# Patient Record
Sex: MALE | Race: WHITE | NOT HISPANIC OR LATINO | Employment: OTHER | URBAN - METROPOLITAN AREA
[De-identification: names, ages, dates, MRNs, and addresses within clinical notes are randomized per-mention and may not be internally consistent; named-entity substitution may affect disease eponyms.]

---

## 2018-08-15 ENCOUNTER — HOSPITAL ENCOUNTER (OUTPATIENT)
Dept: FAMILY MEDICINE CLINIC | Facility: CLINIC | Age: 48
Setting detail: SPECIMEN
Discharge: HOME | End: 2018-08-15
Attending: FAMILY MEDICINE | Admitting: FAMILY MEDICINE

## 2018-08-15 LAB
ALBUMIN SERPL-MCNC: 3.8 G/DL (ref 3.5–4.8)
ALBUMIN/GLOB SERPL: 1.1 {RATIO} (ref 1–1.7)
ALP SERPL-CCNC: 117 IU/L (ref 32–91)
ALT SERPL-CCNC: 17 IU/L (ref 17–63)
ANION GAP SERPL CALC-SCNC: 10.9 MMOL/L (ref 10–20)
AST SERPL-CCNC: 24 IU/L (ref 15–41)
BASOPHILS # BLD AUTO: 0 10*3/UL (ref 0–0.2)
BASOPHILS NFR BLD AUTO: 0 % (ref 0–2)
BILIRUB SERPL-MCNC: 0.7 MG/DL (ref 0.3–1.2)
BUN SERPL-MCNC: 10 MG/DL (ref 8–20)
BUN/CREAT SERPL: 9.1 (ref 6.2–20.3)
CALCIUM SERPL-MCNC: 9.4 MG/DL (ref 8.9–10.3)
CHLORIDE SERPL-SCNC: 102 MMOL/L (ref 101–111)
CHOLEST SERPL-MCNC: 102 MG/DL
CHOLEST/HDLC SERPL: 2.8 {RATIO}
CONV CO2: 31 MMOL/L (ref 22–32)
CONV LDL CHOLESTEROL DIRECT: 53 MG/DL (ref 0–100)
CONV TOTAL PROTEIN: 7.4 G/DL (ref 6.1–7.9)
CREAT UR-MCNC: 1.1 MG/DL (ref 0.7–1.2)
DIFFERENTIAL METHOD BLD: (no result)
EOSINOPHIL # BLD AUTO: 0 % (ref 0–3)
EOSINOPHIL # BLD AUTO: 0 10*3/UL (ref 0–0.3)
ERYTHROCYTE [DISTWIDTH] IN BLOOD BY AUTOMATED COUNT: 14.6 % (ref 11.5–14.5)
GLOBULIN UR ELPH-MCNC: 3.6 G/DL (ref 2.5–3.8)
GLUCOSE SERPL-MCNC: 94 MG/DL (ref 65–99)
HCT VFR BLD AUTO: 48.7 % (ref 40–54)
HDLC SERPL-MCNC: 36 MG/DL
HGB BLD-MCNC: 15.7 G/DL (ref 14–18)
LDLC/HDLC SERPL: 1.5 {RATIO}
LIPID INTERPRETATION: ABNORMAL
LYMPHOCYTES # BLD AUTO: 2.3 10*3/UL (ref 0.8–4.8)
LYMPHOCYTES NFR BLD AUTO: 32 % (ref 18–42)
MCH RBC QN AUTO: 28.4 PG (ref 26–32)
MCHC RBC AUTO-ENTMCNC: 32.3 G/DL (ref 32–36)
MCV RBC AUTO: 87.8 FL (ref 80–94)
MONOCYTES # BLD AUTO: 0.6 10*3/UL (ref 0.1–1.3)
MONOCYTES NFR BLD AUTO: 9 % (ref 2–11)
NEUTROPHILS # BLD AUTO: 4.2 10*3/UL (ref 2.3–8.6)
NEUTROPHILS NFR BLD AUTO: 59 % (ref 50–75)
NRBC BLD AUTO-RTO: 0 /100{WBCS}
NRBC/RBC NFR BLD MANUAL: 0 10*3/UL
PLATELET # BLD AUTO: 292 10*3/UL (ref 150–450)
PMV BLD AUTO: 8.6 FL (ref 7.4–10.4)
POTASSIUM SERPL-SCNC: 4.9 MMOL/L (ref 3.6–5.1)
RBC # BLD AUTO: 5.55 10*6/UL (ref 4.6–6)
SODIUM SERPL-SCNC: 139 MMOL/L (ref 136–144)
TRIGL SERPL-MCNC: 43 MG/DL
VLDLC SERPL CALC-MCNC: 12.9 MG/DL
WBC # BLD AUTO: 7.2 10*3/UL (ref 4.5–11.5)

## 2018-08-23 ENCOUNTER — HOSPITAL ENCOUNTER (OUTPATIENT)
Dept: GENERAL RADIOLOGY | Facility: HOSPITAL | Age: 48
Discharge: HOME | End: 2018-08-23
Attending: FAMILY MEDICINE | Admitting: FAMILY MEDICINE

## 2019-02-05 ENCOUNTER — HOSPITAL ENCOUNTER (OUTPATIENT)
Dept: ORTHOPEDIC SURGERY | Facility: CLINIC | Age: 49
Setting detail: SPECIMEN
Discharge: HOME | End: 2019-02-05
Attending: PHYSICIAN ASSISTANT | Admitting: PHYSICIAN ASSISTANT

## 2019-02-05 LAB
ALBUMIN SERPL-MCNC: 3.8 G/DL (ref 3.5–4.8)
ALBUMIN/GLOB SERPL: 1 {RATIO} (ref 1–1.7)
ALP SERPL-CCNC: 105 IU/L (ref 32–91)
ALT SERPL-CCNC: 21 IU/L (ref 17–63)
ANION GAP SERPL CALC-SCNC: 14.7 MMOL/L (ref 10–20)
AST SERPL-CCNC: 28 IU/L (ref 15–41)
BILIRUB SERPL-MCNC: 0.9 MG/DL (ref 0.3–1.2)
BUN SERPL-MCNC: 13 MG/DL (ref 8–20)
BUN/CREAT SERPL: 13 (ref 6.2–20.3)
CALCIUM SERPL-MCNC: 9.4 MG/DL (ref 8.9–10.3)
CHLORIDE SERPL-SCNC: 104 MMOL/L (ref 101–111)
CONV CO2: 24 MMOL/L (ref 22–32)
CONV TOTAL PROTEIN: 7.7 G/DL (ref 6.1–7.9)
CREAT UR-MCNC: 1 MG/DL (ref 0.7–1.2)
GLOBULIN UR ELPH-MCNC: 3.9 G/DL (ref 2.5–3.8)
GLUCOSE SERPL-MCNC: 89 MG/DL (ref 65–99)
MAGNESIUM SERPL-MCNC: 2 MG/DL (ref 1.8–2.5)
POTASSIUM SERPL-SCNC: 4.7 MMOL/L (ref 3.6–5.1)
SODIUM SERPL-SCNC: 138 MMOL/L (ref 136–144)

## 2019-02-08 ENCOUNTER — HOSPITAL ENCOUNTER (OUTPATIENT)
Dept: OTHER | Facility: HOSPITAL | Age: 49
Discharge: HOME | End: 2019-02-08
Attending: PHYSICIAN ASSISTANT | Admitting: PHYSICIAN ASSISTANT

## 2019-06-25 ENCOUNTER — TELEPHONE (OUTPATIENT)
Dept: CARDIOLOGY | Facility: CLINIC | Age: 49
End: 2019-06-25

## 2019-07-02 NOTE — TELEPHONE ENCOUNTER
LM ON NUMBER LISTED FOR PATIENT. NUMBER FOR WIFE IS WORK NUMBER. PATIENT DOES HAVE UPCOMING APT 7/17/19.

## 2019-07-15 RX ORDER — GABAPENTIN 300 MG/1
CAPSULE ORAL
Qty: 60 CAPSULE | Refills: 1 | Status: SHIPPED | OUTPATIENT
Start: 2019-07-15 | End: 2019-09-04

## 2019-07-17 ENCOUNTER — OFFICE VISIT (OUTPATIENT)
Dept: CARDIOLOGY | Facility: CLINIC | Age: 49
End: 2019-07-17

## 2019-07-17 VITALS
HEIGHT: 70 IN | SYSTOLIC BLOOD PRESSURE: 104 MMHG | DIASTOLIC BLOOD PRESSURE: 72 MMHG | BODY MASS INDEX: 29.35 KG/M2 | OXYGEN SATURATION: 96 % | WEIGHT: 205 LBS | HEART RATE: 63 BPM

## 2019-07-17 DIAGNOSIS — I34.0 NON-RHEUMATIC MITRAL REGURGITATION: ICD-10-CM

## 2019-07-17 DIAGNOSIS — I10 ESSENTIAL (PRIMARY) HYPERTENSION: ICD-10-CM

## 2019-07-17 DIAGNOSIS — I48.0 PAROXYSMAL ATRIAL FIBRILLATION (HCC): Primary | ICD-10-CM

## 2019-07-17 DIAGNOSIS — Z79.01 CHRONIC ANTICOAGULATION: ICD-10-CM

## 2019-07-17 DIAGNOSIS — R00.2 PALPITATIONS: ICD-10-CM

## 2019-07-17 PROBLEM — Z76.89 PERSONS ENCOUNTERING HEALTH SERVICES IN OTHER SPECIFIED CIRCUMSTANCES: Status: ACTIVE | Noted: 2018-05-16

## 2019-07-17 PROCEDURE — 93000 ELECTROCARDIOGRAM COMPLETE: CPT | Performed by: INTERNAL MEDICINE

## 2019-07-17 PROCEDURE — 99214 OFFICE O/P EST MOD 30 MIN: CPT | Performed by: INTERNAL MEDICINE

## 2019-07-17 RX ORDER — ERGOCALCIFEROL 1.25 MG/1
1 CAPSULE ORAL
COMMUNITY
Start: 2019-02-07 | End: 2019-09-04

## 2019-07-17 RX ORDER — APIXABAN 5 MG/1
5 TABLET, FILM COATED ORAL 2 TIMES DAILY
Refills: 8 | COMMUNITY
Start: 2019-06-24 | End: 2019-12-23 | Stop reason: SDUPTHER

## 2019-07-17 RX ORDER — ESCITALOPRAM OXALATE 20 MG/1
20 TABLET ORAL DAILY
Refills: 2 | COMMUNITY
Start: 2019-04-22 | End: 2019-08-04 | Stop reason: SDUPTHER

## 2019-07-17 RX ORDER — ATORVASTATIN CALCIUM 40 MG/1
40 TABLET, FILM COATED ORAL
Refills: 2 | COMMUNITY
Start: 2019-05-24 | End: 2019-08-06 | Stop reason: SDUPTHER

## 2019-07-17 RX ORDER — DILTIAZEM HYDROCHLORIDE 60 MG/1
TABLET, FILM COATED ORAL
COMMUNITY
Start: 2019-04-16 | End: 2019-09-04 | Stop reason: SDUPTHER

## 2019-07-17 NOTE — PROGRESS NOTES
Encounter Date:07/17/2019  Last seen 1/14/2019      Patient ID: Claudy Pabon is a 49 y.o. male.    Chief Complaint:  Follow-up atrial flutter.  Anticoagulation management  Paroxysmal atrial fibrillation.  Hypertension.    History of Present Illness  Patient recently had left hip revision at [facility].  Patient is undergoing physical therapy at [facility] rehab.  Since I have last seen, the patient has been without any chest discomfort ,shortness of breath, palpitations, dizziness or syncope.  Denies having any headache ,abdominal pain ,nausea, vomiting , diarrhea constipation, loss of weight or loss of appetite.  Denies having any excessive bruising ,hematuria or blood in the stool.    Review of all systems negative except as indicated    Assessment and Plan       [[[[[[[[[[[    Impression  ============   - history of atrial flutter.  Patient had flutter ablation 2006 at [facility] by Dr. Gonzalez.    -history of paroxysmal atrial fibrillation.  Patient is in  atrial fibrillation today. patient is on Eliquis.    -anticoagulation - on Eliquis.    - history of stroke from which he partially recovered September 2016    -hypertension -well-controlled.    -history of back surgery and left hip surgery  ==================    Plan  ===============   EKG today showed atrial flutter fibrillation with controlled ventricular response.  Patient is asymptomatic.  Patient has history of intermittent and fibrillation flutter.  Patient is on Eliquis.  Anticoagulation status was reviewed.  Patient is not having any angina pectoris or congestive heart failure.  Medications were reviewed and updated.  Followup in the office in 6 months.  [[[[[[[[[[[[[[[[[[[[           Diagnosis Plan   1. Paroxysmal atrial fibrillation (CMS/HCC)  ECG 12 Lead   2. Palpitations  ECG 12 Lead   3. Essential (primary) hypertension  ECG 12 Lead   4. Non-rheumatic mitral regurgitation  ECG 12 Lead   5. Chronic anticoagulation   ECG 12 Lead   LAB RESULTS (LAST 7 DAYS)    CBC        BMP        CMP         BNP        TROPONIN        CoAg        Creatinine Clearance  CrCl cannot be calculated (Patient's most recent lab result is older than the maximum 30 days allowed.).    ABG        Radiology  No radiology results for the last day                The following portions of the patient's history were reviewed and updated as appropriate: allergies, current medications, past family history, past medical history, past social history, past surgical history and problem list.    Review of Systems   Constitution: Negative for malaise/fatigue.   Cardiovascular: Negative for chest pain, leg swelling, palpitations and syncope.   Respiratory: Negative for shortness of breath.    Skin: Negative for rash.   Gastrointestinal: Negative for nausea and vomiting.   Neurological: Negative for dizziness, light-headedness and numbness.         Current Outpatient Medications:   •  aspirin 81 MG chewable tablet, Chew 81 mg daily., Disp: , Rfl:   •  atorvastatin (LIPITOR) 40 MG tablet, Take 40 mg by mouth every night at bedtime., Disp: , Rfl: 2  •  diltiaZEM (CARDIZEM) 60 MG tablet, , Disp: , Rfl:   •  ELIQUIS 5 MG tablet tablet, Take 5 mg by mouth 2 (Two) Times a Day., Disp: , Rfl: 8  •  ergocalciferol (ERGOCALCIFEROL) 16491 units capsule, 1 capsule Every 7 (Seven) Days., Disp: , Rfl:   •  escitalopram (LEXAPRO) 20 MG tablet, Take 20 mg by mouth Daily., Disp: , Rfl: 2  •  gabapentin (NEURONTIN) 300 MG capsule, TAKE ONE CAPSULE BY MOUTH TWICE DAILY, Disp: 60 capsule, Rfl: 1  •  losartan (COZAAR) 50 MG tablet, , Disp: , Rfl: 0  •  metoprolol succinate XL (TOPROL-XL) 100 MG 24 hr tablet, take 1 tablet by mouth twice a day (Patient taking differently: take 1/2 tablet by mouth twice a day), Disp: 180 tablet, Rfl: 3    No Known Allergies    History reviewed. No pertinent family history.    Past Surgical History:   Procedure Laterality Date   • REVISION OF TOTAL HIP FEMORAL  "Left        Past Medical History:   Diagnosis Date   • AF (paroxysmal atrial fibrillation) (CMS/HCC)    • Atrial flutter (CMS/HCC)    • Stroke (CMS/HCC)     2016       History reviewed. No pertinent family history.    Social History     Socioeconomic History   • Marital status:      Spouse name: Not on file   • Number of children: Not on file   • Years of education: Not on file   • Highest education level: Not on file   Tobacco Use   • Smoking status: Never Smoker   Substance and Sexual Activity   • Alcohol use: Yes           ECG 12 Lead  Date/Time: 7/17/2019 1:39 PM  Performed by: Giancarlo Birch MD  Authorized by: Giancarlo Birch MD   Comparison: compared with previous ECG   Comments: Atrial fibrillation with controlled ventricular response 64/min nonspecific ST-T wave changes normal axis normal intervals no ectopy.  No change from 1/14/2019              Objective:       Physical Exam    /72 (BP Location: Left arm, Patient Position: Sitting, Cuff Size: Large Adult)   Pulse 63   Ht 177.8 cm (70\")   Wt 93 kg (205 lb)   SpO2 96%   BMI 29.41 kg/m²   The patient is alert, oriented and in no distress.    Vital signs as noted above.    Head and neck revealed no carotid bruits or jugular venous distension.  No thyromegaly or lymphadenopathy is present.    Lungs clear.  No wheezing.  Breath sounds are normal bilaterally.    Heart normal first and second heart sounds.  No murmur..  No pericardial rub is present.  No gallop is present.    Abdomen soft and nontender.  No organomegaly is present.    Extremities revealed good peripheral pulses without any pedal edema.    Skin warm and dry.    Musculoskeletal system is grossly normal.    CNS grossly normal.        "

## 2019-08-05 RX ORDER — ESCITALOPRAM OXALATE 20 MG/1
TABLET ORAL
Qty: 30 TABLET | Refills: 0 | Status: SHIPPED | OUTPATIENT
Start: 2019-08-05 | End: 2019-09-04

## 2019-08-06 RX ORDER — ATORVASTATIN CALCIUM 40 MG/1
TABLET, FILM COATED ORAL
Qty: 90 TABLET | Refills: 1 | Status: CANCELLED | OUTPATIENT
Start: 2019-08-06

## 2019-08-06 RX ORDER — ATORVASTATIN CALCIUM 40 MG/1
40 TABLET, FILM COATED ORAL
Qty: 90 TABLET | Refills: 1 | Status: SHIPPED | OUTPATIENT
Start: 2019-08-06 | End: 2020-02-06

## 2019-09-04 ENCOUNTER — OFFICE VISIT (OUTPATIENT)
Dept: FAMILY MEDICINE CLINIC | Facility: CLINIC | Age: 49
End: 2019-09-04

## 2019-09-04 VITALS
SYSTOLIC BLOOD PRESSURE: 102 MMHG | BODY MASS INDEX: 29.2 KG/M2 | HEART RATE: 75 BPM | WEIGHT: 204 LBS | DIASTOLIC BLOOD PRESSURE: 70 MMHG | HEIGHT: 70 IN | OXYGEN SATURATION: 97 %

## 2019-09-04 DIAGNOSIS — I48.0 PAROXYSMAL ATRIAL FIBRILLATION (HCC): Primary | ICD-10-CM

## 2019-09-04 DIAGNOSIS — R73.9 HYPERGLYCEMIA: ICD-10-CM

## 2019-09-04 DIAGNOSIS — Z00.00 WELL ADULT EXAM: ICD-10-CM

## 2019-09-04 DIAGNOSIS — I10 ESSENTIAL (PRIMARY) HYPERTENSION: ICD-10-CM

## 2019-09-04 RX ORDER — DILTIAZEM HYDROCHLORIDE 60 MG/1
60 TABLET, FILM COATED ORAL 2 TIMES DAILY
Start: 2019-09-04 | End: 2020-02-06

## 2019-09-04 RX ORDER — METOPROLOL TARTRATE 50 MG/1
25 TABLET, FILM COATED ORAL
COMMUNITY
Start: 2018-04-23 | End: 2019-09-04

## 2019-09-04 RX ORDER — METHOCARBAMOL 500 MG/1
TABLET, FILM COATED ORAL 3 TIMES DAILY
COMMUNITY
Start: 2015-12-04 | End: 2019-09-04

## 2019-09-04 RX ORDER — FLECAINIDE ACETATE 50 MG/1
TABLET ORAL
COMMUNITY
Start: 2012-05-03 | End: 2019-09-04

## 2019-09-04 NOTE — PATIENT INSTRUCTIONS
Stop losartan  Wean off gabapetin 1 tab daily for 2 wk, then 1 tab every other day for 2 wks, then stop  lexapro-- 1/2 tab daily for 2 wks, then 1/2 tab every other day for 2wks, then stop

## 2019-09-04 NOTE — PROGRESS NOTES
Subjective   Claudy Pabon is a 49 y.o. male.     Chief Complaint   Patient presents with   • Annual Exam       HPI   Here for annual exam  Wants to come off some meds- gabapentin and lexapro    Cologuard needs to be ordered    Review of Systems   Respiratory: Negative for shortness of breath.    Cardiovascular: Negative for chest pain.   Gastrointestinal: Negative for constipation and diarrhea.   Genitourinary: Negative for dysuria.         Past Medical History:     Reviewed history from 11/16/2016 and no changes required:        Paroxysmal atrial fibrillation and flutter.  Ablation by Dr. Gonzalez in 12/2006. This information from the patient since those records are not available.        Degenerative disk disease and spondylosis. Past spine surgery initially 2007.        Surgery of left sacroiliac joint 2013.        Left L4-5 laminotomy nerve root decompression 10/30/2014.        Wound infection  and bacteremia with MSSA November 2014. I+D lumbar spine 11/17/2014.   Completed 4 weeks IV cefazolin 2 g q8h.        Echocardiogram  11/14/2014. LV normal size cavity size and normal contractility EF 65%.   Mild LAE. Mild to moderate MR. Mild TR. RVSP 40. MV and TV  structurally unremarkable.        YOAN  11/20/2014.   No valvular vegetations.  Significant MR.        Hypertension        No diabetes mellitus or dyslipidemia.        No alcohol or cigarette use.        ORIF left jaw fracture 1995        ORIF nasal fracture         Total left hip replacement 07/06/2015.        left leg pain/weakness/numbness        Facet block L4/5 & RF  Dr Jaguar Granado with [facility]         cva 2016 from afib/clot                td-medicare        cologuard ordered            Past Surgical History:     Reviewed history from 11/16/2016 and no changes required:        Heart ablation (12/2006):  Patient had previous atrial flutter and had an ablation by Dr. Gonzalez in 12/2006. This information from the patient since those records are not  "available. Patient had brief periods of erratic heartbeat. He had no shortness of breath or chest tightness or lightheadedness. Preop EKG 10/09/2014 showed sinus rhythm, LAE, nonspecific ST segment abnormaility, and OTC of 400.        Colonoscopy (1995)        EGD (2003)        Cardiac Cath (2006):         Lumbar fusion L5-S1       (1/20/2007)        Eplantation of L5-s1 with implantation of L5-S1  1/16/2012  Dr Sánchez        Exploration/removal of ELIOT bone stimulator and implantation of L5-S1  9/7/2012  Dr Sánchez        Left SI Fusion (10/7/13)  Dr Sánchez         Laminectomy Lumbar Decompression (Left) L4-L5 (2014)  Dr Sánchez        I&D Irrigation 11/17/14  Dr Sánchez        Left Hip Replacement 7/6/2015 Dr Marx    Past Medical History:   Diagnosis Date   • AF (paroxysmal atrial fibrillation) (CMS/HCC)    • Atrial flutter (CMS/HCC)    • Stroke (CMS/HCC)     2016     Past Surgical History:   Procedure Laterality Date   • REVISION OF TOTAL HIP FEMORAL Left      History reviewed. No pertinent family history.  Social History     Tobacco Use   • Smoking status: Never Smoker   Substance Use Topics   • Alcohol use: Yes       No Known Allergies        Current Outpatient Medications:   •  aspirin 81 MG chewable tablet, Chew 81 mg daily., Disp: , Rfl:   •  atorvastatin (LIPITOR) 40 MG tablet, Take 1 tablet by mouth every night at bedtime., Disp: 90 tablet, Rfl: 1  •  diltiaZEM (CARDIZEM) 60 MG tablet, Take 1 tablet by mouth 2 (Two) Times a Day., Disp: , Rfl:   •  ELIQUIS 5 MG tablet tablet, Take 5 mg by mouth 2 (Two) Times a Day., Disp: , Rfl: 8  •  metoprolol succinate XL (TOPROL-XL) 100 MG 24 hr tablet, take 1 tablet by mouth twice a day (Patient taking differently: take 1/2 tablet by mouth twice a day), Disp: 180 tablet, Rfl: 3          Visit Vitals  /70 (BP Location: Right arm, Cuff Size: Adult)   Pulse 75   Ht 177.8 cm (70\")   Wt 92.5 kg (204 lb)   SpO2 97%   BMI 29.27 kg/m²       Objective       Physical Exam "   Constitutional: He is oriented to person, place, and time. He appears well-developed and well-nourished.   HENT:   Head: Normocephalic and atraumatic.   Cardiovascular: Normal rate and normal heart sounds.   irreg irreg   Pulmonary/Chest: Effort normal and breath sounds normal.   Neurological: He is alert and oriented to person, place, and time.   Psychiatric: He has a normal mood and affect. His behavior is normal. Judgment and thought content normal.   Vitals reviewed.        Diagnoses and all orders for this visit:    1. Paroxysmal atrial fibrillation (CMS/HCC) (Primary)  -     CBC & Differential; Future  -     Comprehensive Metabolic Panel; Future  -     Hemoglobin A1c; Future  -     Lipid Panel; Future  -     TSH; Future  -     Vitamin B12; Future    2. Essential (primary) hypertension  Comments:  bp lower- stop losartan  Orders:  -     CBC & Differential; Future  -     Comprehensive Metabolic Panel; Future  -     Hemoglobin A1c; Future  -     Lipid Panel; Future  -     TSH; Future  -     Vitamin B12; Future    3. Hyperglycemia  -     CBC & Differential; Future  -     Comprehensive Metabolic Panel; Future  -     Hemoglobin A1c; Future  -     Lipid Panel; Future  -     TSH; Future  -     Vitamin B12; Future    4. Well adult exam  -     CBC & Differential; Future  -     Comprehensive Metabolic Panel; Future  -     Hemoglobin A1c; Future  -     Lipid Panel; Future  -     TSH; Future  -     Vitamin B12; Future    Other orders  -     diltiaZEM (CARDIZEM) 60 MG tablet; Take 1 tablet by mouth 2 (Two) Times a Day.

## 2019-09-13 RX ORDER — GABAPENTIN 300 MG/1
CAPSULE ORAL
Qty: 60 CAPSULE | Refills: 1 | Status: SHIPPED | OUTPATIENT
Start: 2019-09-13 | End: 2019-11-16 | Stop reason: SDUPTHER

## 2019-11-16 RX ORDER — GABAPENTIN 300 MG/1
CAPSULE ORAL
Qty: 60 CAPSULE | Refills: 1 | Status: SHIPPED | OUTPATIENT
Start: 2019-11-16 | End: 2020-01-22

## 2019-11-18 RX ORDER — METOPROLOL TARTRATE 50 MG/1
TABLET, FILM COATED ORAL
Qty: 180 TABLET | Refills: 1 | Status: SHIPPED | OUTPATIENT
Start: 2019-11-18 | End: 2020-02-12

## 2019-12-04 ENCOUNTER — TELEPHONE (OUTPATIENT)
Dept: FAMILY MEDICINE CLINIC | Facility: CLINIC | Age: 49
End: 2019-12-04

## 2019-12-04 RX ORDER — ESCITALOPRAM OXALATE 10 MG/1
10 TABLET ORAL DAILY
Qty: 30 TABLET | Refills: 0 | Status: SHIPPED | OUTPATIENT
Start: 2019-12-04 | End: 2020-01-09 | Stop reason: SDUPTHER

## 2019-12-04 NOTE — TELEPHONE ENCOUNTER
Pt seen Dr. Gusman on 9/4 19 and wanted to wean off lexapro . Dr. Gusman told pt that any time he wanted to go back on the lexapro to just call . If you could send pt's lexapro into [facility] in [place] ?

## 2019-12-23 RX ORDER — APIXABAN 5 MG/1
5 TABLET, FILM COATED ORAL 2 TIMES DAILY
Qty: 60 TABLET | Refills: 5 | Status: SHIPPED | OUTPATIENT
Start: 2019-12-23 | End: 2020-01-22

## 2020-01-09 RX ORDER — ESCITALOPRAM OXALATE 10 MG/1
10 TABLET ORAL DAILY
Qty: 30 TABLET | Refills: 0 | Status: SHIPPED | OUTPATIENT
Start: 2020-01-09 | End: 2020-02-26 | Stop reason: SDUPTHER

## 2020-01-17 PROBLEM — H05.019 CELLULITIS OF ORBIT: Status: ACTIVE | Noted: 2017-04-07

## 2020-01-17 PROBLEM — Z13.820 SCREENING FOR OSTEOPOROSIS: Status: ACTIVE | Noted: 2019-02-05

## 2020-01-17 PROBLEM — M85.89 OTHER SPECIFIED DISORDERS OF BONE DENSITY AND STRUCTURE, MULTIPLE SITES: Status: ACTIVE | Noted: 2019-02-13

## 2020-01-17 PROBLEM — Z96.649 FAILED TOTAL HIP ARTHROPLASTY (HCC): Status: ACTIVE | Noted: 2019-04-17

## 2020-01-17 PROBLEM — E66.3 OVERWEIGHT: Status: ACTIVE | Noted: 2018-08-15

## 2020-01-17 PROBLEM — R13.10 DYSPHAGIA: Status: ACTIVE | Noted: 2018-08-15

## 2020-01-17 PROBLEM — Z91.89 AT RISK FOR OSTEOPOROSIS: Status: ACTIVE | Noted: 2019-02-05

## 2020-01-17 PROBLEM — T84.018A FAILED TOTAL HIP ARTHROPLASTY (HCC): Status: ACTIVE | Noted: 2019-04-17

## 2020-01-17 PROBLEM — Z83.3 FAMILY HISTORY OF DIABETES MELLITUS: Status: ACTIVE | Noted: 2020-01-17
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
## 2020-01-22 ENCOUNTER — OFFICE VISIT (OUTPATIENT)
Dept: FAMILY MEDICINE CLINIC | Facility: CLINIC | Age: 50
End: 2020-01-22

## 2020-01-22 ENCOUNTER — LAB (OUTPATIENT)
Dept: FAMILY MEDICINE CLINIC | Facility: CLINIC | Age: 50
End: 2020-01-22

## 2020-01-22 VITALS
TEMPERATURE: 98 F | BODY MASS INDEX: 30.55 KG/M2 | HEIGHT: 70 IN | SYSTOLIC BLOOD PRESSURE: 106 MMHG | OXYGEN SATURATION: 97 % | DIASTOLIC BLOOD PRESSURE: 71 MMHG | RESPIRATION RATE: 16 BRPM | WEIGHT: 213.4 LBS | HEART RATE: 78 BPM

## 2020-01-22 DIAGNOSIS — G89.29 CHRONIC LOW BACK PAIN, UNSPECIFIED BACK PAIN LATERALITY, UNSPECIFIED WHETHER SCIATICA PRESENT: ICD-10-CM

## 2020-01-22 DIAGNOSIS — R73.9 HYPERGLYCEMIA: ICD-10-CM

## 2020-01-22 DIAGNOSIS — I10 ESSENTIAL (PRIMARY) HYPERTENSION: ICD-10-CM

## 2020-01-22 DIAGNOSIS — F33.0 MILD EPISODE OF RECURRENT MAJOR DEPRESSIVE DISORDER (HCC): ICD-10-CM

## 2020-01-22 DIAGNOSIS — M54.50 CHRONIC LOW BACK PAIN, UNSPECIFIED BACK PAIN LATERALITY, UNSPECIFIED WHETHER SCIATICA PRESENT: ICD-10-CM

## 2020-01-22 DIAGNOSIS — F41.9 ANXIETY: Primary | ICD-10-CM

## 2020-01-22 DIAGNOSIS — I63.9 CEREBROVASCULAR ACCIDENT (CVA), UNSPECIFIED MECHANISM (HCC): ICD-10-CM

## 2020-01-22 LAB — HBA1C MFR BLD: 5.8 % (ref 3.5–5.6)

## 2020-01-22 PROCEDURE — 85025 COMPLETE CBC W/AUTO DIFF WBC: CPT | Performed by: NURSE PRACTITIONER

## 2020-01-22 PROCEDURE — 80053 COMPREHEN METABOLIC PANEL: CPT | Performed by: NURSE PRACTITIONER

## 2020-01-22 PROCEDURE — 80061 LIPID PANEL: CPT | Performed by: NURSE PRACTITIONER

## 2020-01-22 PROCEDURE — 99214 OFFICE O/P EST MOD 30 MIN: CPT | Performed by: NURSE PRACTITIONER

## 2020-01-22 PROCEDURE — 83036 HEMOGLOBIN GLYCOSYLATED A1C: CPT | Performed by: NURSE PRACTITIONER

## 2020-01-22 PROCEDURE — 36415 COLL VENOUS BLD VENIPUNCTURE: CPT

## 2020-01-22 RX ORDER — APIXABAN 5 MG/1
TABLET, FILM COATED ORAL
Qty: 60 TABLET | Refills: 5 | Status: SHIPPED | OUTPATIENT
Start: 2020-01-22 | End: 2020-03-09 | Stop reason: SDUPTHER

## 2020-01-22 RX ORDER — GABAPENTIN 300 MG/1
CAPSULE ORAL
Qty: 60 CAPSULE | Refills: 3 | Status: SHIPPED | OUTPATIENT
Start: 2020-01-22 | End: 2020-03-12 | Stop reason: SDUPTHER

## 2020-01-22 NOTE — PROGRESS NOTES
Subjective   Claudy Pabon is a 49 y.o. male.     Pt is here today to follow up on anxiety and hyperlipidemia.  He was a previous pt of Dr. Gusman.    A-fib- on eliquis. Pt is current with cardiology for a fib.  He has had a previous ablation for a flutter in the past.  He now sees Dr. Birch.    Stroke- had a stroke in 2016.  Currently taking eliquis 5mg and 81mg ASA.  Doing well, with some mild right sided residual. Is not current with neuro, but is interested in seeing one.    Anxiety- currently taking 10mg lexapro.  He tried coming off of the medication, but became too agitated so he restarted.  Wife states that he is doing much better since being back on the medication. Denies SI or HI.  Denies any medication side effects.    Hyperlipidemia- currently taking lipitor 40mg daily.  Tries to eat a well balanced diet.      Takes gabapentin for back pain and has continued it since having a stroke.  He would like to wean off of it.       The following portions of the patient's history were reviewed and updated as appropriate: allergies, current medications, past family history, past medical history, past social history, past surgical history and problem list.    Review of Systems   Constitutional: Negative for appetite change, chills, fatigue and fever.   HENT: Negative for congestion, ear pain, hearing loss, postnasal drip, rhinorrhea, sinus pressure, sore throat, swollen glands and trouble swallowing.    Eyes: Negative for blurred vision, double vision, pain, discharge, itching and visual disturbance.        Some right sided vision issues d/t stroke   Respiratory: Negative for cough, chest tightness, shortness of breath and wheezing.    Cardiovascular: Negative for chest pain, palpitations and leg swelling.   Gastrointestinal: Positive for constipation. Negative for abdominal pain, blood in stool, diarrhea, nausea and vomiting.   Endocrine: Negative for polydipsia, polyphagia and polyuria.   Genitourinary: Negative  "for dysuria, flank pain, frequency and urgency.   Musculoskeletal: Positive for back pain. Negative for arthralgias and myalgias.   Skin: Negative for rash and skin lesions.   Neurological: Negative for dizziness, weakness, numbness and headache.        Some balance issues from previous stroke   Psychiatric/Behavioral: Negative for self-injury, sleep disturbance, suicidal ideas, depressed mood (controlled) and stress. The patient is not nervous/anxious.        Objective   /71 (BP Location: Right arm, Patient Position: Sitting, Cuff Size: Large Adult)   Pulse 78   Temp 98 °F (36.7 °C) (Oral)   Resp 16   Ht 177.8 cm (70\")   Wt 96.8 kg (213 lb 6.4 oz)   SpO2 97%   BMI 30.62 kg/m²   Physical Exam   Constitutional: He is oriented to person, place, and time. He appears well-developed and well-nourished. No distress.   HENT:   Head: Normocephalic and atraumatic.   Cardiovascular: Normal rate and normal heart sounds.   Irregular rhythm   Pulmonary/Chest: Effort normal and breath sounds normal. No respiratory distress.   Abdominal: Soft. Bowel sounds are normal. There is no tenderness.   Musculoskeletal: He exhibits no edema.   Neurological: He is alert and oriented to person, place, and time.   Some focal deficits noted.  Uvula pulsation   Skin: Skin is warm and dry.   Psychiatric: He has a normal mood and affect. His behavior is normal. Judgment and thought content normal.         Assessment/Plan     Diagnoses and all orders for this visit:    1. Anxiety (Primary)  Comments:  cont lexapro    2. Mild episode of recurrent major depressive disorder (CMS/HCC)  Comments:  stable,   cont lexapro      3. Essential (primary) hypertension  Comments:  cont current meds  follow up with cardiology  Orders:  -     CBC & Differential  -     Comprehensive Metabolic Panel; Future  -     Lipid Panel; Future    4. Cerebrovascular accident (CVA), unspecified mechanism (CMS/HCC)  Comments:  referral to neuro for check up  cont " eliquis  Orders:  -     Ambulatory Referral to Neurology    5. Hyperglycemia  -     Hemoglobin A1c; Future    6. Chronic low back pain, unspecified back pain laterality, unspecified whether sciatica present  Comments:  start weaning off of gabapentin  call with any update

## 2020-01-22 NOTE — PATIENT INSTRUCTIONS
Continue current meds  Wean off gabapetin 1 tab daily for 2 wk, then 1 tab every other day for 2 wks, then stop  Check blood work  Call for any issues or concerns  Referral to neurology

## 2020-01-23 LAB
ALBUMIN SERPL-MCNC: 4.4 G/DL (ref 3.5–5.2)
ALBUMIN/GLOB SERPL: 1.3 G/DL
ALP SERPL-CCNC: 122 U/L (ref 39–117)
ALT SERPL W P-5'-P-CCNC: 16 U/L (ref 1–41)
ANION GAP SERPL CALCULATED.3IONS-SCNC: 11.3 MMOL/L (ref 5–15)
AST SERPL-CCNC: 24 U/L (ref 1–40)
BASOPHILS # BLD AUTO: 0.02 10*3/MM3 (ref 0–0.2)
BASOPHILS NFR BLD AUTO: 0.3 % (ref 0–1.5)
BILIRUB SERPL-MCNC: 0.4 MG/DL (ref 0.2–1.2)
BUN BLD-MCNC: 14 MG/DL (ref 6–20)
BUN/CREAT SERPL: 15.4 (ref 7–25)
CALCIUM SPEC-SCNC: 9.6 MG/DL (ref 8.6–10.5)
CHLORIDE SERPL-SCNC: 100 MMOL/L (ref 98–107)
CHOLEST SERPL-MCNC: 112 MG/DL (ref 0–200)
CO2 SERPL-SCNC: 28.7 MMOL/L (ref 22–29)
CREAT BLD-MCNC: 0.91 MG/DL (ref 0.76–1.27)
DEPRECATED RDW RBC AUTO: 42.5 FL (ref 37–54)
EOSINOPHIL # BLD AUTO: 0.04 10*3/MM3 (ref 0–0.4)
EOSINOPHIL NFR BLD AUTO: 0.5 % (ref 0.3–6.2)
ERYTHROCYTE [DISTWIDTH] IN BLOOD BY AUTOMATED COUNT: 13.3 % (ref 12.3–15.4)
GFR SERPL CREATININE-BSD FRML MDRD: 88 ML/MIN/1.73
GLOBULIN UR ELPH-MCNC: 3.3 GM/DL
GLUCOSE BLD-MCNC: 77 MG/DL (ref 65–99)
HCT VFR BLD AUTO: 49 % (ref 37.5–51)
HDLC SERPL-MCNC: 42 MG/DL (ref 40–60)
HGB BLD-MCNC: 15.9 G/DL (ref 13–17.7)
IMM GRANULOCYTES # BLD AUTO: 0.02 10*3/MM3 (ref 0–0.05)
IMM GRANULOCYTES NFR BLD AUTO: 0.3 % (ref 0–0.5)
LDLC SERPL CALC-MCNC: 56 MG/DL (ref 0–100)
LDLC/HDLC SERPL: 1.34 {RATIO}
LYMPHOCYTES # BLD AUTO: 2.54 10*3/MM3 (ref 0.7–3.1)
LYMPHOCYTES NFR BLD AUTO: 33.4 % (ref 19.6–45.3)
MCH RBC QN AUTO: 28.2 PG (ref 26.6–33)
MCHC RBC AUTO-ENTMCNC: 32.4 G/DL (ref 31.5–35.7)
MCV RBC AUTO: 86.9 FL (ref 79–97)
MONOCYTES # BLD AUTO: 0.67 10*3/MM3 (ref 0.1–0.9)
MONOCYTES NFR BLD AUTO: 8.8 % (ref 5–12)
NEUTROPHILS # BLD AUTO: 4.32 10*3/MM3 (ref 1.7–7)
NEUTROPHILS NFR BLD AUTO: 56.7 % (ref 42.7–76)
NRBC BLD AUTO-RTO: 0 /100 WBC (ref 0–0.2)
PLATELET # BLD AUTO: 272 10*3/MM3 (ref 140–450)
PMV BLD AUTO: 10.8 FL (ref 6–12)
POTASSIUM BLD-SCNC: 4.4 MMOL/L (ref 3.5–5.2)
PROT SERPL-MCNC: 7.7 G/DL (ref 6–8.5)
RBC # BLD AUTO: 5.64 10*6/MM3 (ref 4.14–5.8)
SODIUM BLD-SCNC: 140 MMOL/L (ref 136–145)
TRIGL SERPL-MCNC: 68 MG/DL (ref 0–150)
VLDLC SERPL-MCNC: 13.6 MG/DL (ref 5–40)
WBC NRBC COR # BLD: 7.61 10*3/MM3 (ref 3.4–10.8)

## 2020-02-06 RX ORDER — DILTIAZEM HYDROCHLORIDE 60 MG/1
TABLET, FILM COATED ORAL
Qty: 60 TABLET | Refills: 0 | Status: SHIPPED | OUTPATIENT
Start: 2020-02-06 | End: 2020-03-11 | Stop reason: SDUPTHER

## 2020-02-06 RX ORDER — ATORVASTATIN CALCIUM 40 MG/1
TABLET, FILM COATED ORAL
Qty: 30 TABLET | Refills: 0 | Status: SHIPPED | OUTPATIENT
Start: 2020-02-06 | End: 2020-03-11 | Stop reason: SDUPTHER

## 2020-02-12 ENCOUNTER — OFFICE VISIT (OUTPATIENT)
Dept: CARDIOLOGY | Facility: CLINIC | Age: 50
End: 2020-02-12

## 2020-02-12 VITALS
OXYGEN SATURATION: 95 % | HEIGHT: 70 IN | SYSTOLIC BLOOD PRESSURE: 117 MMHG | WEIGHT: 213.5 LBS | HEART RATE: 85 BPM | BODY MASS INDEX: 30.56 KG/M2 | DIASTOLIC BLOOD PRESSURE: 79 MMHG

## 2020-02-12 DIAGNOSIS — I48.0 PAROXYSMAL ATRIAL FIBRILLATION (HCC): ICD-10-CM

## 2020-02-12 DIAGNOSIS — I10 ESSENTIAL (PRIMARY) HYPERTENSION: Primary | ICD-10-CM

## 2020-02-12 DIAGNOSIS — Z79.01 CHRONIC ANTICOAGULATION: ICD-10-CM

## 2020-02-12 DIAGNOSIS — R00.2 PALPITATIONS: ICD-10-CM

## 2020-02-12 DIAGNOSIS — I48.92 PAROXYSMAL ATRIAL FLUTTER (HCC): ICD-10-CM

## 2020-02-12 PROCEDURE — 99214 OFFICE O/P EST MOD 30 MIN: CPT | Performed by: INTERNAL MEDICINE

## 2020-02-12 PROCEDURE — 93000 ELECTROCARDIOGRAM COMPLETE: CPT | Performed by: INTERNAL MEDICINE

## 2020-02-12 NOTE — PROGRESS NOTES
Encounter Date:02/12/2020  Last seen 7/17/2019      Patient ID: Claudy Pabon is a 49 y.o. male.    Chief Complaint:  History of atrial flutter  Anticoagulation management  Paroxysmal atrial fibrillation  Hypertension      History of Present Illness  Since I have last seen, the patient has been without any chest discomfort ,shortness of breath, palpitations, dizziness or syncope.  Denies having any headache ,abdominal pain ,nausea, vomiting , diarrhea constipation, loss of weight or loss of appetite.  Denies having any excessive bruising ,hematuria or blood in the stool.    Review of all systems negative except as indicated    Assessment and Plan       [[[[[[[[[[[    Impression  ============   - history of atrial flutter.  Patient had flutter ablation 2006 at [facility] by Dr. Gonzalez.     -history of paroxysmal atrial fibrillation.  Patient is in  atrial fibrillation today. patient is on Eliquis.     -anticoagulation - on Eliquis.     - history of stroke from which he partially recovered September 2016     -hypertension -well-controlled.     -history of back surgery and left hip surgery  ==================    Plan  ===============   EKG today showed atrial fibrillation flutter fibrillation with controlled ventricular response.  Patient is asymptomatic.   Patient has history of intermittent and fibrillation flutter.  Patient is on Eliquis.  Anticoagulation status was reviewed.  Patient is not having any angina pectoris or congestive heart failure.  Medications were reviewed and updated.  Followup in the office in 6 months.  [[[[[[[[[[[[[[[[[[[[           Diagnosis Plan   1. Essential (primary) hypertension     2. Palpitations     3. Paroxysmal atrial fibrillation (CMS/HCC)     4. Paroxysmal atrial flutter (CMS/HCC)     5. Chronic anticoagulation     LAB RESULTS (LAST 7 DAYS)    CBC        BMP        CMP         BNP        TROPONIN        CoAg        Creatinine Clearance  Estimated Creatinine Clearance:  112.1 mL/min (by C-G formula based on SCr of 0.91 mg/dL).    ABG        Radiology  No radiology results for the last day                The following portions of the patient's history were reviewed and updated as appropriate: allergies, current medications, past family history, past medical history, past social history, past surgical history and problem list.    Review of Systems   Constitution: Negative for malaise/fatigue.   Cardiovascular: Negative for chest pain, leg swelling, palpitations and syncope.   Respiratory: Negative for shortness of breath.    Skin: Negative for rash.   Gastrointestinal: Negative for nausea and vomiting.   Neurological: Negative for dizziness, light-headedness and numbness.         Current Outpatient Medications:   •  aspirin 81 MG chewable tablet, Chew 81 mg daily., Disp: , Rfl:   •  atorvastatin (LIPITOR) 40 MG tablet, TAKE ONE TABLET BY MOUTH EVERY NIGHT AT BEDTIME, Disp: 30 tablet, Rfl: 0  •  dilTIAZem (CARDIZEM) 60 MG tablet, TAKE ONE TABLET BY MOUTH TWICE DAILY, Disp: 60 tablet, Rfl: 0  •  ELIQUIS 5 MG tablet tablet, TAKE ONE TABLET BY MOUTH TWICE DAILY, Disp: 60 tablet, Rfl: 5  •  escitalopram (LEXAPRO) 10 MG tablet, Take 1 tablet by mouth Daily., Disp: 30 tablet, Rfl: 0  •  gabapentin (NEURONTIN) 300 MG capsule, TAKE ONE CAPSULE BY MOUTH TWICE DAILY, Disp: 60 capsule, Rfl: 3  •  metoprolol tartrate (LOPRESSOR) 12.5 MG half tablet, Take 25 mg by mouth 2 (Two) Times a Day., Disp: , Rfl:     No Known Allergies    History reviewed. No pertinent family history.    Past Surgical History:   Procedure Laterality Date   • REVISION OF TOTAL HIP FEMORAL Left        Past Medical History:   Diagnosis Date   • AF (paroxysmal atrial fibrillation) (CMS/HCC)    • Atrial flutter (CMS/HCC)    • Stroke (CMS/HCC)     2016       History reviewed. No pertinent family history.    Social History     Socioeconomic History   • Marital status:      Spouse name: Not on file   • Number of children:  "Not on file   • Years of education: Not on file   • Highest education level: Not on file   Tobacco Use   • Smoking status: Never Smoker   • Smokeless tobacco: Never Used   Substance and Sexual Activity   • Alcohol use: Never     Frequency: Never           ECG 12 Lead  Date/Time: 2/12/2020 5:33 PM  Performed by: Giancarlo Birch MD  Authorized by: Giancarlo Birch MD   Comparison: compared with previous ECG   Similar to previous ECG  Comments: Atrial fibrillation with controlled ventricular response 75/min nonspecific ST-T wave changes normal axis normal intervals no ectopy compared to 7/17/2019 no significant changes seen              Objective:       Physical Exam    /79   Pulse 85   Ht 177.8 cm (70\")   Wt 96.8 kg (213 lb 8 oz)   SpO2 95%   BMI 30.63 kg/m²   The patient is alert, oriented and in no distress.    Vital signs as noted above.    Head and neck revealed no carotid bruits or jugular venous distension.  No thyromegaly or lymphadenopathy is present.    Lungs clear.  No wheezing.  Breath sounds are normal bilaterally.    Heart normal first and second heart sounds.  No murmur..  No pericardial rub is present.  No gallop is present.    Abdomen soft and nontender.  No organomegaly is present.    Extremities revealed good peripheral pulses without any pedal edema.    Skin warm and dry.    Musculoskeletal system is grossly normal.    CNS grossly normal.        "

## 2020-02-26 RX ORDER — ESCITALOPRAM OXALATE 10 MG/1
10 TABLET ORAL DAILY
Qty: 90 TABLET | Refills: 1 | Status: SHIPPED | OUTPATIENT
Start: 2020-02-26 | End: 2020-03-11 | Stop reason: SDUPTHER

## 2020-03-11 ENCOUNTER — TELEPHONE (OUTPATIENT)
Dept: CARDIOLOGY | Facility: CLINIC | Age: 50
End: 2020-03-11

## 2020-03-11 RX ORDER — DILTIAZEM HYDROCHLORIDE 60 MG/1
TABLET, FILM COATED ORAL
Qty: 180 TABLET | Refills: 1 | Status: SHIPPED | OUTPATIENT
Start: 2020-03-11 | End: 2020-03-11 | Stop reason: SDUPTHER

## 2020-03-11 RX ORDER — DILTIAZEM HYDROCHLORIDE 60 MG/1
60 TABLET, FILM COATED ORAL 2 TIMES DAILY
Qty: 180 TABLET | Refills: 3 | Status: SHIPPED | OUTPATIENT
Start: 2020-03-11 | End: 2020-03-11

## 2020-03-11 RX ORDER — DILTIAZEM HYDROCHLORIDE 60 MG/1
60 TABLET, FILM COATED ORAL 2 TIMES DAILY
Qty: 60 TABLET | Refills: 0 | Status: SHIPPED | OUTPATIENT
Start: 2020-03-11 | End: 2020-03-17 | Stop reason: SDUPTHER

## 2020-03-11 RX ORDER — ESCITALOPRAM OXALATE 10 MG/1
10 TABLET ORAL DAILY
Qty: 90 TABLET | Refills: 1 | Status: SHIPPED | OUTPATIENT
Start: 2020-03-11 | End: 2020-03-13 | Stop reason: SDUPTHER

## 2020-03-11 RX ORDER — ATORVASTATIN CALCIUM 40 MG/1
40 TABLET, FILM COATED ORAL
Qty: 60 TABLET | Refills: 0 | Status: SHIPPED | OUTPATIENT
Start: 2020-03-11 | End: 2020-03-17 | Stop reason: SDUPTHER

## 2020-03-11 RX ORDER — ATORVASTATIN CALCIUM 40 MG/1
TABLET, FILM COATED ORAL
Qty: 90 TABLET | Refills: 1 | Status: SHIPPED | OUTPATIENT
Start: 2020-03-11 | End: 2020-03-11 | Stop reason: SDUPTHER

## 2020-03-11 RX ORDER — ATORVASTATIN CALCIUM 40 MG/1
40 TABLET, FILM COATED ORAL
Qty: 90 TABLET | Refills: 3 | Status: SHIPPED | OUTPATIENT
Start: 2020-03-11 | End: 2020-03-11

## 2020-03-11 NOTE — TELEPHONE ENCOUNTER
ATORVASTAIN 40 MG 1 QD, DILTIAZIM 60 MG 1 BID    #14  [facility] TO HOLD HIM UNTIL WE [employer] MAIL ORDER IS RECEIVED

## 2020-03-12 RX ORDER — GABAPENTIN 300 MG/1
300 CAPSULE ORAL 2 TIMES DAILY
Qty: 60 CAPSULE | Refills: 3 | Status: SHIPPED | OUTPATIENT
Start: 2020-03-12 | End: 2020-07-12

## 2020-03-13 RX ORDER — ESCITALOPRAM OXALATE 10 MG/1
10 TABLET ORAL DAILY
Qty: 90 TABLET | Refills: 1 | Status: SHIPPED | OUTPATIENT
Start: 2020-03-13 | End: 2020-03-17 | Stop reason: SDUPTHER

## 2020-03-17 RX ORDER — ESCITALOPRAM OXALATE 10 MG/1
10 TABLET ORAL DAILY
Qty: 90 TABLET | Refills: 1 | Status: SHIPPED | OUTPATIENT
Start: 2020-03-17 | End: 2020-08-24

## 2020-03-17 RX ORDER — DILTIAZEM HYDROCHLORIDE 60 MG/1
60 TABLET, FILM COATED ORAL 2 TIMES DAILY
Qty: 180 TABLET | Refills: 3 | Status: SHIPPED | OUTPATIENT
Start: 2020-03-17 | End: 2021-02-08

## 2020-03-17 RX ORDER — ATORVASTATIN CALCIUM 40 MG/1
40 TABLET, FILM COATED ORAL
Qty: 90 TABLET | Refills: 3 | Status: SHIPPED | OUTPATIENT
Start: 2020-03-17 | End: 2020-03-19 | Stop reason: SDUPTHER

## 2020-03-19 RX ORDER — ATORVASTATIN CALCIUM 40 MG/1
40 TABLET, FILM COATED ORAL
Qty: 90 TABLET | Refills: 3 | Status: SHIPPED | OUTPATIENT
Start: 2020-03-19 | End: 2021-04-01

## 2020-04-06 RX ORDER — METOPROLOL TARTRATE 50 MG/1
TABLET, FILM COATED ORAL
Qty: 180 TABLET | Refills: 3 | Status: SHIPPED | OUTPATIENT
Start: 2020-04-06 | End: 2021-01-28

## 2020-07-12 RX ORDER — GABAPENTIN 300 MG/1
CAPSULE ORAL
Qty: 60 CAPSULE | Refills: 3 | Status: SHIPPED | OUTPATIENT
Start: 2020-07-12 | End: 2021-10-20

## 2020-08-12 ENCOUNTER — OFFICE VISIT (OUTPATIENT)
Dept: CARDIOLOGY | Facility: CLINIC | Age: 50
End: 2020-08-12

## 2020-08-12 VITALS
DIASTOLIC BLOOD PRESSURE: 88 MMHG | SYSTOLIC BLOOD PRESSURE: 123 MMHG | OXYGEN SATURATION: 98 % | HEART RATE: 88 BPM | BODY MASS INDEX: 30.49 KG/M2 | HEIGHT: 70 IN | WEIGHT: 213 LBS

## 2020-08-12 DIAGNOSIS — I48.92 PAROXYSMAL ATRIAL FLUTTER (HCC): ICD-10-CM

## 2020-08-12 DIAGNOSIS — Z79.01 CHRONIC ANTICOAGULATION: ICD-10-CM

## 2020-08-12 DIAGNOSIS — I34.0 NON-RHEUMATIC MITRAL REGURGITATION: ICD-10-CM

## 2020-08-12 DIAGNOSIS — I10 ESSENTIAL (PRIMARY) HYPERTENSION: Primary | ICD-10-CM

## 2020-08-12 DIAGNOSIS — R00.2 PALPITATIONS: ICD-10-CM

## 2020-08-12 PROCEDURE — 99214 OFFICE O/P EST MOD 30 MIN: CPT | Performed by: INTERNAL MEDICINE

## 2020-08-12 PROCEDURE — 93000 ELECTROCARDIOGRAM COMPLETE: CPT | Performed by: INTERNAL MEDICINE

## 2020-08-12 NOTE — PROGRESS NOTES
Encounter Date:08/12/2020  Last seen 2/12/2020      Patient ID: Claudy Pabon is a 50 y.o. male.    Chief Complaint:    History of atrial flutter  Anticoagulation management  Paroxysmal atrial fibrillation  Hypertension        History of Present Illness    Since I have last seen, the patient has been without any chest discomfort ,shortness of breath, palpitations, dizziness or syncope.  Denies having any headache ,abdominal pain ,nausea, vomiting , diarrhea constipation, loss of weight or loss of appetite.  Denies having any excessive bruising ,hematuria or blood in the stool.     Review of all systems negative except as indicated     Assessment and Plan         [[[[[[[[[[[    Impression  ============   - history of atrial flutter.  Patient had flutter ablation 2006 at [facility] by Dr. Gonzalez.     -history of paroxysmal atrial fibrillation.  Patient is in  atrial fibrillation today.  Patient is on Eliquis.     -anticoagulation - on Eliquis.     - history of stroke from which he partially recovered September 2016     -hypertension -well-controlled.     -history of back surgery and left hip surgery  ==================    Plan  ===============   EKG today showed atrial fibrillation flutter fibrillation with controlled ventricular response.  Patient is asymptomatic.   Patient has history of intermittent and fibrillation flutter.  Patient is on Eliquis.  Anticoagulation status was reviewed.  Patient is not having any angina pectoris or congestive heart failure.  Wife has been brought with the issue of any need for watchman procedure.  I have discussed and educated her and patient and I did not think he would be needing watchman procedure at this time since he really does not have any problems with taking anticoagulants.  Medications were reviewed and updated.  Followup in the office in 6 months.  [[[[[[[[[[[[[[[[[[[[             Diagnosis Plan   1. Essential (primary) hypertension     2. Paroxysmal atrial  flutter (CMS/HCC)     3. Chronic anticoagulation     4. Non-rheumatic mitral regurgitation     5. Palpitations     LAB RESULTS (LAST 7 DAYS)    CBC        BMP        CMP         BNP        TROPONIN        CoAg        Creatinine Clearance  CrCl cannot be calculated (Patient's most recent lab result is older than the maximum 30 days allowed.).    ABG        Radiology  No radiology results for the last day                The following portions of the patient's history were reviewed and updated as appropriate: allergies, current medications, past family history, past medical history, past social history, past surgical history and problem list.    Review of Systems   Constitution: Negative for malaise/fatigue.   Cardiovascular: Negative for chest pain, leg swelling, palpitations and syncope.   Respiratory: Negative for shortness of breath.    Skin: Negative for rash.   Gastrointestinal: Negative for nausea and vomiting.   Neurological: Negative for dizziness, light-headedness and numbness.         Current Outpatient Medications:   •  apixaban (Eliquis) 5 MG tablet tablet, Take 1 tablet by mouth 2 (Two) Times a Day., Disp: 180 tablet, Rfl: 3  •  aspirin 81 MG chewable tablet, Chew 81 mg daily., Disp: , Rfl:   •  atorvastatin (LIPITOR) 40 MG tablet, Take 1 tablet by mouth every night at bedtime., Disp: 90 tablet, Rfl: 3  •  dilTIAZem (CARDIZEM) 60 MG tablet, Take 1 tablet by mouth 2 (Two) Times a Day., Disp: 180 tablet, Rfl: 3  •  escitalopram (Lexapro) 10 MG tablet, Take 1 tablet by mouth Daily., Disp: 90 tablet, Rfl: 1  •  gabapentin (NEURONTIN) 300 MG capsule, TAKE 1 CAPSULE TWICE DAILY, Disp: 60 capsule, Rfl: 3  •  metoprolol tartrate (LOPRESSOR) 50 MG tablet, TAKE 1 TABLET TWICE DAILY, Disp: 180 tablet, Rfl: 3    No Known Allergies    No family history on file.    Past Surgical History:   Procedure Laterality Date   • REVISION OF TOTAL HIP FEMORAL Left        Past Medical History:   Diagnosis Date   • AF (paroxysmal  "atrial fibrillation) (CMS/HCC)    • Atrial flutter (CMS/HCC)    • Stroke (CMS/HCC)     2016       No family history on file.    Social History     Socioeconomic History   • Marital status:      Spouse name: Not on file   • Number of children: Not on file   • Years of education: Not on file   • Highest education level: Not on file   Tobacco Use   • Smoking status: Never Smoker   • Smokeless tobacco: Former User     Types: Chew   Substance and Sexual Activity   • Alcohol use: Never     Frequency: Never           ECG 12 Lead  Date/Time: 8/12/2020 2:15 PM  Performed by: Giancarlo Birch MD  Authorized by: Giancarlo Birch MD   Comparison: compared with previous ECG   Similar to previous ECG  Comparison to previous ECG: Atrial fibrillation with controlled ventricular response nonspecific ST-T wave changes 82/min normal axis normal intervals no ectopy no change from 2/12/2020                Objective:       Physical Exam    /88 (BP Location: Right arm, Patient Position: Sitting, Cuff Size: Large Adult)   Pulse 88   Ht 177.8 cm (70\")   Wt 96.6 kg (213 lb)   SpO2 98%   BMI 30.56 kg/m²   The patient is alert, oriented and in no distress.    Vital signs as noted above.    Head and neck revealed no carotid bruits or jugular venous distension.  No thyromegaly or lymphadenopathy is present.    Lungs clear.  No wheezing.  Breath sounds are normal bilaterally.    Heart normal first and second heart sounds.  No murmur..  No pericardial rub is present.  No gallop is present.    Abdomen soft and nontender.  No organomegaly is present.    Extremities revealed good peripheral pulses without any pedal edema.    Skin warm and dry.    Musculoskeletal system is grossly normal.    CNS grossly normal.        "

## 2020-08-24 RX ORDER — ESCITALOPRAM OXALATE 10 MG/1
TABLET ORAL
Qty: 90 TABLET | Refills: 1 | Status: SHIPPED | OUTPATIENT
Start: 2020-08-24 | End: 2020-09-09

## 2020-09-09 ENCOUNTER — RESULTS ENCOUNTER (OUTPATIENT)
Dept: FAMILY MEDICINE CLINIC | Facility: CLINIC | Age: 50
End: 2020-09-09

## 2020-09-09 ENCOUNTER — LAB (OUTPATIENT)
Dept: FAMILY MEDICINE CLINIC | Facility: CLINIC | Age: 50
End: 2020-09-09

## 2020-09-09 ENCOUNTER — OFFICE VISIT (OUTPATIENT)
Dept: FAMILY MEDICINE CLINIC | Facility: CLINIC | Age: 50
End: 2020-09-09

## 2020-09-09 VITALS
DIASTOLIC BLOOD PRESSURE: 80 MMHG | SYSTOLIC BLOOD PRESSURE: 117 MMHG | HEIGHT: 70 IN | BODY MASS INDEX: 31.07 KG/M2 | WEIGHT: 217 LBS | HEART RATE: 95 BPM | TEMPERATURE: 96.8 F | OXYGEN SATURATION: 97 %

## 2020-09-09 DIAGNOSIS — R73.03 PRE-DIABETES: ICD-10-CM

## 2020-09-09 DIAGNOSIS — F41.9 ANXIETY: ICD-10-CM

## 2020-09-09 DIAGNOSIS — R29.6 FREQUENT FALLS: ICD-10-CM

## 2020-09-09 DIAGNOSIS — R13.10 DYSPHAGIA, UNSPECIFIED TYPE: ICD-10-CM

## 2020-09-09 DIAGNOSIS — Z00.00 PREVENTATIVE HEALTH CARE: ICD-10-CM

## 2020-09-09 DIAGNOSIS — I63.9 CEREBROVASCULAR ACCIDENT (CVA), UNSPECIFIED MECHANISM (HCC): Primary | ICD-10-CM

## 2020-09-09 DIAGNOSIS — I10 ESSENTIAL (PRIMARY) HYPERTENSION: ICD-10-CM

## 2020-09-09 DIAGNOSIS — Z12.11 COLON CANCER SCREENING: ICD-10-CM

## 2020-09-09 LAB
ALBUMIN SERPL-MCNC: 4.4 G/DL (ref 3.5–5.2)
ALBUMIN/GLOB SERPL: 1.5 G/DL
ALP SERPL-CCNC: 134 U/L (ref 39–117)
ALT SERPL W P-5'-P-CCNC: 12 U/L (ref 1–41)
ANION GAP SERPL CALCULATED.3IONS-SCNC: 9 MMOL/L (ref 5–15)
AST SERPL-CCNC: 22 U/L (ref 1–40)
BASOPHILS # BLD AUTO: 0.03 10*3/MM3 (ref 0–0.2)
BASOPHILS NFR BLD AUTO: 0.4 % (ref 0–1.5)
BILIRUB SERPL-MCNC: 0.7 MG/DL (ref 0–1.2)
BUN SERPL-MCNC: 12 MG/DL (ref 6–20)
BUN/CREAT SERPL: 12.6 (ref 7–25)
CALCIUM SPEC-SCNC: 9.4 MG/DL (ref 8.6–10.5)
CHLORIDE SERPL-SCNC: 102 MMOL/L (ref 98–107)
CHOLEST SERPL-MCNC: 100 MG/DL (ref 0–200)
CO2 SERPL-SCNC: 30 MMOL/L (ref 22–29)
CREAT SERPL-MCNC: 0.95 MG/DL (ref 0.76–1.27)
DEPRECATED RDW RBC AUTO: 42.4 FL (ref 37–54)
EOSINOPHIL # BLD AUTO: 0.01 10*3/MM3 (ref 0–0.4)
EOSINOPHIL NFR BLD AUTO: 0.1 % (ref 0.3–6.2)
ERYTHROCYTE [DISTWIDTH] IN BLOOD BY AUTOMATED COUNT: 13.1 % (ref 12.3–15.4)
GFR SERPL CREATININE-BSD FRML MDRD: 83 ML/MIN/1.73
GLOBULIN UR ELPH-MCNC: 3 GM/DL
GLUCOSE SERPL-MCNC: 82 MG/DL (ref 65–99)
HBA1C MFR BLD: 6.1 % (ref 3.5–5.6)
HCT VFR BLD AUTO: 49.4 % (ref 37.5–51)
HCV AB SER DONR QL: NORMAL
HDLC SERPL-MCNC: 39 MG/DL (ref 40–60)
HGB BLD-MCNC: 15.5 G/DL (ref 13–17.7)
IMM GRANULOCYTES # BLD AUTO: 0.01 10*3/MM3 (ref 0–0.05)
IMM GRANULOCYTES NFR BLD AUTO: 0.1 % (ref 0–0.5)
LDLC SERPL CALC-MCNC: 52 MG/DL (ref 0–100)
LDLC/HDLC SERPL: 1.32 {RATIO}
LYMPHOCYTES # BLD AUTO: 2.31 10*3/MM3 (ref 0.7–3.1)
LYMPHOCYTES NFR BLD AUTO: 34.2 % (ref 19.6–45.3)
MCH RBC QN AUTO: 28.1 PG (ref 26.6–33)
MCHC RBC AUTO-ENTMCNC: 31.4 G/DL (ref 31.5–35.7)
MCV RBC AUTO: 89.5 FL (ref 79–97)
MONOCYTES # BLD AUTO: 0.52 10*3/MM3 (ref 0.1–0.9)
MONOCYTES NFR BLD AUTO: 7.7 % (ref 5–12)
NEUTROPHILS NFR BLD AUTO: 3.87 10*3/MM3 (ref 1.7–7)
NEUTROPHILS NFR BLD AUTO: 57.5 % (ref 42.7–76)
NRBC BLD AUTO-RTO: 0 /100 WBC (ref 0–0.2)
PLATELET # BLD AUTO: 291 10*3/MM3 (ref 140–450)
PMV BLD AUTO: 10.4 FL (ref 6–12)
POTASSIUM SERPL-SCNC: 5.1 MMOL/L (ref 3.5–5.2)
PROT SERPL-MCNC: 7.4 G/DL (ref 6–8.5)
RBC # BLD AUTO: 5.52 10*6/MM3 (ref 4.14–5.8)
SODIUM SERPL-SCNC: 141 MMOL/L (ref 136–145)
TRIGL SERPL-MCNC: 47 MG/DL (ref 0–150)
VLDLC SERPL-MCNC: 9.4 MG/DL
WBC # BLD AUTO: 6.75 10*3/MM3 (ref 3.4–10.8)

## 2020-09-09 PROCEDURE — 85025 COMPLETE CBC W/AUTO DIFF WBC: CPT | Performed by: NURSE PRACTITIONER

## 2020-09-09 PROCEDURE — 83036 HEMOGLOBIN GLYCOSYLATED A1C: CPT | Performed by: NURSE PRACTITIONER

## 2020-09-09 PROCEDURE — 99214 OFFICE O/P EST MOD 30 MIN: CPT | Performed by: NURSE PRACTITIONER

## 2020-09-09 PROCEDURE — 86803 HEPATITIS C AB TEST: CPT | Performed by: NURSE PRACTITIONER

## 2020-09-09 PROCEDURE — 36415 COLL VENOUS BLD VENIPUNCTURE: CPT

## 2020-09-09 PROCEDURE — 80053 COMPREHEN METABOLIC PANEL: CPT | Performed by: NURSE PRACTITIONER

## 2020-09-09 PROCEDURE — 80061 LIPID PANEL: CPT | Performed by: NURSE PRACTITIONER

## 2020-09-09 RX ORDER — ESCITALOPRAM OXALATE 20 MG/1
20 TABLET ORAL DAILY
Qty: 90 TABLET | Refills: 0 | Status: SHIPPED | OUTPATIENT
Start: 2020-09-09 | End: 2021-04-26

## 2020-09-09 NOTE — PROGRESS NOTES
Subjective   Claudy Pabon is a 50 y.o. male.     Pt is here today to follow up on hypertension, anxiety, afib, hyperlipidemia.  Pt is here with his wife.    She states that his gait has not been as good lately.  He has had two falls recently.  Has been losing balance more lately.    HTN- currently on metoprolol 25mg bid. Doing well on medication.    A-fib- on eliquis. Pt is current with cardiology for a fib- Dr. Birch.  He has had a previous ablation for a flutter in the past.       Stroke- had a stroke in 2016.  Currently taking eliquis 5mg and 81mg ASA.  Doing well, with some  right sided residual. Pt was not able to make appt with neuro because of the pandemic.     Anxiety- currently taking 10mg lexapro.  Denies SI or HI.  Denies any medication side effects. He is doing well at this time.  He does have some moments where he gets anxious and focuses on things. They would like to try increasing the dose to 20mg. Denies and SI or HI.     Hyperlipidemia- currently taking lipitor 40mg daily.  Tries to eat a well balanced diet.       Labs- due  Colonoscopy- wants the cologuard- had one but never completed it  PSA-    Vaccines:  Flu-  Tdap-  Shingles-  PNA-    Dental exam-  Eye exam-         The following portions of the patient's history were reviewed and updated as appropriate: allergies, current medications, past family history, past medical history, past social history, past surgical history and problem list.    Review of Systems   Constitutional: Negative for chills, fatigue and fever.   HENT: Positive for trouble swallowing. Negative for congestion, ear pain and sinus pressure.    Eyes: Negative for blurred vision and double vision.   Respiratory: Negative for chest tightness and shortness of breath.    Cardiovascular: Negative for chest pain and palpitations.   Gastrointestinal: Negative for abdominal distention, abdominal pain, constipation, diarrhea, nausea and vomiting.   Genitourinary: Negative for dysuria  "and frequency.   Musculoskeletal: Negative for arthralgias and myalgias.   Skin: Negative for rash.   Neurological: Positive for weakness. Negative for dizziness, numbness and headache.   Psychiatric/Behavioral: Negative for self-injury, suicidal ideas, depressed mood and stress. The patient is not nervous/anxious.        Objective   /80 (BP Location: Left arm, Patient Position: Sitting, Cuff Size: Large Adult)   Pulse 95   Temp 96.8 °F (36 °C) (Temporal)   Ht 177.8 cm (70\")   Wt 98.4 kg (217 lb)   SpO2 97%   BMI 31.14 kg/m²   Physical Exam   Constitutional: He is oriented to person, place, and time. He appears well-developed and well-nourished. No distress.   HENT:   Head: Normocephalic and atraumatic.   Eyes:   Right eye pulsates   Neck:   Pulsation in neck- no JVD or bruit   Cardiovascular: Normal rate, regular rhythm and normal heart sounds.   No murmur heard.  Pulmonary/Chest: Effort normal and breath sounds normal. No respiratory distress.   Abdominal: Soft. Bowel sounds are normal. There is no tenderness.   Musculoskeletal: Normal range of motion.   Neurological: He is alert and oriented to person, place, and time.   Slurred speech- right sided weakness s/p stroke   Skin: Skin is warm and dry.   Psychiatric: He has a normal mood and affect. His behavior is normal. Judgment and thought content normal.         Assessment/Plan     Diagnoses and all orders for this visit:    1. Cerebrovascular accident (CVA), unspecified mechanism (CMS/HCC) (Primary)  Comments:  happened in 2016  needs to make appt with neuro  on eliquis  PT ordered for weakness  Orders:  -     Ambulatory Referral to Physical Therapy Evaluate and treat    2. Frequent falls  Comments:  PT ordered for strength training  Orders:  -     Ambulatory Referral to Physical Therapy Evaluate and treat    3. Essential (primary) hypertension  Comments:  stable  cont metoprolol    4. Anxiety  Comments:  no change  increase lexapro to 20mg " daily  denies SI or HI  f/u 3 mo  Orders:  -     escitalopram (Lexapro) 20 MG tablet; Take 1 tablet by mouth Daily.  Dispense: 90 tablet; Refill: 0    5. Colon cancer screening  -     Cologuard - Stool, Per Rectum; Future    6. Preventative health care  Comments:  work on diet and exercise  Orders:  -     Comprehensive Metabolic Panel; Future  -     CBC & Differential  -     Lipid Panel; Future  -     Hepatitis C Antibody; Future    7. Pre-diabetes  -     Hemoglobin A1c; Future    8. Dysphagia, unspecified type  Comments:  pulsation in neck  will refer to ENT  may need swallow study  Orders:  -     Ambulatory Referral to ENT (Otolaryngology)

## 2020-09-09 NOTE — PATIENT INSTRUCTIONS
Increase lexapro to 20mg daily  Referral to ENT  Call and make appt with Dr. Chao  Complete cologuard  Complete blood work  Continue other meds  Physical therapy ordered

## 2020-09-15 ENCOUNTER — TREATMENT (OUTPATIENT)
Dept: PHYSICAL THERAPY | Facility: CLINIC | Age: 50
End: 2020-09-15

## 2020-09-15 DIAGNOSIS — R29.6 FREQUENT FALLS: ICD-10-CM

## 2020-09-15 DIAGNOSIS — I63.9 CEREBROVASCULAR ACCIDENT (CVA), UNSPECIFIED MECHANISM (HCC): Primary | ICD-10-CM

## 2020-09-15 PROCEDURE — 97112 NEUROMUSCULAR REEDUCATION: CPT | Performed by: PHYSICAL THERAPIST

## 2020-09-15 PROCEDURE — 97162 PT EVAL MOD COMPLEX 30 MIN: CPT | Performed by: PHYSICAL THERAPIST

## 2020-09-15 NOTE — PROGRESS NOTES
"Physical Therapy Initial Evaluation and Plan of Care    Patient: Claudy Pabon   : 1970  Diagnosis/ICD-10 Code:  Cerebrovascular accident (CVA), unspecified mechanism (CMS/[facility]) [I63.9]  Referring practitioner: ITALO Montano  Date of Initial Visit: 9/15/2020  Today's Date: 9/15/2020  Patient seen for 1 sessions           Subjective Questionnaire: LEFS 46%      Subjective 49 yo male s/p cva and episodes of falling. Pt had cva ~4 years ago and was doing relatively well until insidious exacerbation in mobility deficits 4-5 months ago. Pt is now falling every ~1 month. Denies dizziness. Pt feels he has trouble with reacting with balance and has trouble turning. Denies pain. Not using an assistive device. Pt notes vision deficits since cva. Pt notes episodes of falling when trying to lift something heavy or \"scooting\" an object with his foot. L DILAN in 2019, which went well per pt. Pt with L thigh numbness but he relates this to a prior back injury, no new symptoms since CVA.  NP follow up: 20  Social hx: On disability, was working as a  prior to cva      Objective          Ambulation     Comments   LE strength 5/5 bilaterally   UE strength 5/5  UEs and LEs grossly uncoordinated   Unable to maintain narrow DELON with eyes open. Fails wide DELON with eyes closed.   Does not accept external perturbation.   Min to mod A x 1 with ambulation. Transfers with modified independence.  Steppage gait with wide DELON. Inconsistent through swing phase and heel strike. Increased gait velocity.  Tinetti:   TU.5 seconds  Five times sit to stand 28 seconds    Assessment & Plan     Assessment  Impairments: abnormal coordination, abnormal gait, activity intolerance, impaired balance, lacks appropriate home exercise program and safety issue  Assessment details: 49 yo male s/p cva and episodes of falling. Pt is with a good response to treatment this date and would benefit from further evaluation and " treatment to address the above impairments. Pt and his wife are advised he should be using a rolling wx at all times due to his high risk of falling as per his outcome and physical performance measures.  Prognosis: good  Functional Limitations: carrying objects, lifting, walking, pulling, pushing, standing and reaching overhead  Goals  Plan Goals: Short term goals, 1 week: Tolerate HEP progression.  Voice compliance with activity modification.  Report improvement in frequency of falling and LOB.    LTGs, 8 weeks: Improve LEFS to 65%.  Improve Tinetti score to 18/28.  Improve timed up and go to 18 seconds.  Improve five times sit to stand to 22 seconds.  Ambulate 10 minutes or more without LOB or limitation when using straight cane.  Independent with HEP.        Plan  Therapy options: will be seen for skilled physical therapy services  Other planned modality interventions: modalities prn  Planned therapy interventions: balance/weight-bearing training, body mechanics training, flexibility, functional ROM exercises, gait training, home exercise program, manual therapy, neuromuscular re-education, strengthening, stretching and therapeutic activities  Frequency: 2-3 times per week.  Treatment plan discussed with: patient and family  Plan details: 30 visits per POC, 90 day certification period      Timed:         Manual Therapy:         mins  99400;     Therapeutic Exercise:         mins  61647;     Neuromuscular Jessie:    15    mins  01772;    Therapeutic Activity:          mins  24640;     Gait Training:           mins  27235;     Ultrasound:          mins  51644;    Ionto                                   mins   15349  Self Care                            mins   73327    Un-Timed:  Electrical Stimulation:         mins  04834 ( );  Traction          mins 23777  Low Eval          Mins  00900  Mod Eval     30     Mins  71742  High Eval                            Mins  42448  Re-Eval                                mins  53601        Timed Treatment:   15   mins   Total Treatment:     45   mins    PT SIGNATURE: Juan Campbell PT, DPT, OCS  [place] license: [id number]  DATE TREATMENT INITIATED: 9/15/2020    Initial Certification  Certification Period: 12/14/2020  I certify that the therapy services are furnished while this patient is under my care.  The services outlined above are required for this patient and will be reviewed every 90 days.     PHYSICIAN: _____________________________    Eugenia John APRN      DATE:     Please sign and return via fax to [phone]. Thank you, [facility].

## 2020-09-22 ENCOUNTER — TREATMENT (OUTPATIENT)
Dept: PHYSICAL THERAPY | Facility: CLINIC | Age: 50
End: 2020-09-22

## 2020-09-22 DIAGNOSIS — I63.9 CEREBROVASCULAR ACCIDENT (CVA), UNSPECIFIED MECHANISM (HCC): Primary | ICD-10-CM

## 2020-09-22 DIAGNOSIS — R29.6 FREQUENT FALLS: ICD-10-CM

## 2020-09-22 PROCEDURE — 97112 NEUROMUSCULAR REEDUCATION: CPT | Performed by: PHYSICAL THERAPIST

## 2020-09-22 PROCEDURE — 97110 THERAPEUTIC EXERCISES: CPT | Performed by: PHYSICAL THERAPIST

## 2020-09-22 PROCEDURE — 97116 GAIT TRAINING THERAPY: CPT | Performed by: PHYSICAL THERAPIST

## 2020-09-22 NOTE — PROGRESS NOTES
Physical Therapy Daily Progress Note  Claudy Pabon   1970   Primary Diagnosis: Cerebrovascular accident (CVA), unspecified mechanism (CMS/HCC) [I63.9]   Type: THERAPY  Noted: 9/15/2020   2020   Visits: 2       Subjective   Pt reports: No new complaints. No falls since IE date per pt. Using rolling wx as instructed.      Objective     See Exercise, Manual, and Modality Logs for complete treatment.     Patient Education: HEP    Assessment/Plan Fatigued post rx. Gait is ataxic.      Progress per Plan of Care            Timed:         Manual Therapy:         mins  33961;     Therapeutic Exercise:    10     mins  61363;     Neuromuscular Jessie:    15    mins  09717;    Therapeutic Activity:          mins  09599;     Gait Trainin     mins  94069;     Ultrasound:          mins  32423;    Ionto                                   mins   25978  Self Care                            mins   59059    Un-Timed:  Electrical Stimulation:         mins  33209 ( );  Traction          mins 33445  Low Eval          Mins  53801  Mod Eval          Mins  18011  High Eval                            Mins  74600  Re-Eval                               mins  28834    Timed Treatment:   38   mins   Total Treatment:     38   mins    Juan Campbell, PT, DPT, OCS  [place] license: [id number]  Physical Therapist

## 2020-09-29 ENCOUNTER — TREATMENT (OUTPATIENT)
Dept: PHYSICAL THERAPY | Facility: CLINIC | Age: 50
End: 2020-09-29

## 2020-09-29 DIAGNOSIS — I63.9 CEREBROVASCULAR ACCIDENT (CVA), UNSPECIFIED MECHANISM (HCC): Primary | ICD-10-CM

## 2020-09-29 DIAGNOSIS — R29.6 FREQUENT FALLS: ICD-10-CM

## 2020-09-29 PROCEDURE — 97116 GAIT TRAINING THERAPY: CPT | Performed by: PHYSICAL THERAPIST

## 2020-09-29 PROCEDURE — 97110 THERAPEUTIC EXERCISES: CPT | Performed by: PHYSICAL THERAPIST

## 2020-09-29 PROCEDURE — 97112 NEUROMUSCULAR REEDUCATION: CPT | Performed by: PHYSICAL THERAPIST

## 2020-09-29 NOTE — PROGRESS NOTES
Physical Therapy Daily Progress Note  Claudy Pabon   1970   Primary Diagnosis: Cerebrovascular accident (CVA), unspecified mechanism (CMS/HCC) [I63.9]   Type: THERAPY  Noted: 9/15/2020   2020   Visits: 3       Subjective   Pt reports: No recent falls per pt. Continuing to use walker as directed.      Objective     See Exercise, Manual, and Modality Logs for complete treatment.     Patient Education: HEP    Assessment/Plan Gait is still ataxic. Endurance improving but still limiting pt.      Progress per Plan of Care            Timed:         Manual Therapy:         mins  38112;     Therapeutic Exercise:    10     mins  79685;     Neuromuscular Jessie:    15    mins  16960;    Therapeutic Activity:          mins  54211;     Gait Trainin     mins  62549;     Ultrasound:          mins  99122;    Ionto                                   mins   04592  Self Care                            mins   53587    Un-Timed:  Electrical Stimulation:         mins  57281 ( );  Traction          mins 43485  Low Eval          Mins  83298  Mod Eval          Mins  30537  High Eval                            Mins  82128  Re-Eval                               mins  10644    Timed Treatment:   45   mins   Total Treatment:     45   mins    Juan Campbell, PT, DPT, OCS  [place] license: [id number]  Physical Therapist

## 2020-10-02 ENCOUNTER — TREATMENT (OUTPATIENT)
Dept: PHYSICAL THERAPY | Facility: CLINIC | Age: 50
End: 2020-10-02

## 2020-10-02 DIAGNOSIS — I63.9 CEREBROVASCULAR ACCIDENT (CVA), UNSPECIFIED MECHANISM (HCC): Primary | ICD-10-CM

## 2020-10-02 DIAGNOSIS — R29.6 FREQUENT FALLS: ICD-10-CM

## 2020-10-02 PROCEDURE — 97110 THERAPEUTIC EXERCISES: CPT | Performed by: PHYSICAL THERAPIST

## 2020-10-02 PROCEDURE — 97112 NEUROMUSCULAR REEDUCATION: CPT | Performed by: PHYSICAL THERAPIST

## 2020-10-02 PROCEDURE — 97116 GAIT TRAINING THERAPY: CPT | Performed by: PHYSICAL THERAPIST

## 2020-10-02 NOTE — PROGRESS NOTES
Physical Therapy Daily Progress Note  Claudy Pabon   1970   Primary Diagnosis: Cerebrovascular accident (CVA), unspecified mechanism (CMS/HCC) [I63.9]   Type: THERAPY  Noted: 9/15/2020   10/2/2020   Visits: 4       Subjective   Pt reports: No new complaints vs last visit. HEP going well.      Objective     See Exercise, Manual, and Modality Logs for complete treatment.     Patient Education: HEP    Assessment/Plan      Progress per Plan of Care            Timed:         Manual Therapy:         mins  94963;     Therapeutic Exercise:    10     mins  79285;     Neuromuscular Jessie:    15    mins  64088;    Therapeutic Activity:          mins  67880;     Gait Training:      15     mins  01428;     Ultrasound:          mins  83143;    Ionto                                   mins   20159  Self Care                            mins   09105    Un-Timed:  Electrical Stimulation:         mins  00174 ( );  Traction          mins 66813  Low Eval          Mins  70318  Mod Eval          Mins  23701  High Eval                            Mins  15036  Re-Eval                               mins  05165    Timed Treatment:   40   mins   Total Treatment:     40   mins    Juan Campbell, PT, DPT, OCS  [place] license: [id number]  Physical Therapist

## 2020-10-06 ENCOUNTER — TREATMENT (OUTPATIENT)
Dept: PHYSICAL THERAPY | Facility: CLINIC | Age: 50
End: 2020-10-06

## 2020-10-06 DIAGNOSIS — R29.6 FREQUENT FALLS: ICD-10-CM

## 2020-10-06 DIAGNOSIS — I63.9 CEREBROVASCULAR ACCIDENT (CVA), UNSPECIFIED MECHANISM (HCC): Primary | ICD-10-CM

## 2020-10-06 PROCEDURE — 97116 GAIT TRAINING THERAPY: CPT | Performed by: PHYSICAL THERAPIST

## 2020-10-06 PROCEDURE — 97110 THERAPEUTIC EXERCISES: CPT | Performed by: PHYSICAL THERAPIST

## 2020-10-06 PROCEDURE — 97112 NEUROMUSCULAR REEDUCATION: CPT | Performed by: PHYSICAL THERAPIST

## 2020-10-06 NOTE — PROGRESS NOTES
Physical Therapy Daily Progress Note  Claudy Pabon   1970   Primary Diagnosis: Cerebrovascular accident (CVA), unspecified mechanism (CMS/HCC) [I63.9]   Type: THERAPY  Noted: 9/15/2020   10/6/2020   Visits: 5       Subjective   Pt reports: No new complaints vs last visit. HEP going well.      Objective     See Exercise, Manual, and Modality Logs for complete treatment.     Patient Education: HEP    Assessment/Plan Fatigue post rx. Difficulty maintaing balance in narrow DELON.      Progress per Plan of Care            Timed:         Manual Therapy:         mins  49175;     Therapeutic Exercise:    10     mins  40633;     Neuromuscular Jessie:    15    mins  94272;    Therapeutic Activity:          mins  91776;     Gait Training:      15     mins  25201;     Ultrasound:          mins  72069;    Ionto                                   mins   63169  Self Care                            mins   77888    Un-Timed:  Electrical Stimulation:         mins  63820 ( );  Traction          mins 67483  Low Eval          Mins  15465  Mod Eval          Mins  23193  High Eval                            Mins  11084  Re-Eval                               mins  58559    Timed Treatment:   40   mins   Total Treatment:     40   mins    Juan Campbell PT, DPT, OCS  [place] license: [id number]  Physical Therapist

## 2020-10-08 ENCOUNTER — TREATMENT (OUTPATIENT)
Dept: PHYSICAL THERAPY | Facility: CLINIC | Age: 50
End: 2020-10-08

## 2020-10-08 DIAGNOSIS — R29.6 FREQUENT FALLS: ICD-10-CM

## 2020-10-08 DIAGNOSIS — I63.9 CEREBROVASCULAR ACCIDENT (CVA), UNSPECIFIED MECHANISM (HCC): Primary | ICD-10-CM

## 2020-10-08 PROCEDURE — 97112 NEUROMUSCULAR REEDUCATION: CPT | Performed by: PHYSICAL THERAPIST

## 2020-10-08 PROCEDURE — 97110 THERAPEUTIC EXERCISES: CPT | Performed by: PHYSICAL THERAPIST

## 2020-10-08 PROCEDURE — 97116 GAIT TRAINING THERAPY: CPT | Performed by: PHYSICAL THERAPIST

## 2020-10-08 NOTE — PROGRESS NOTES
Physical Therapy Daily Progress Note  Claudy Pabon   1970   Primary Diagnosis: Cerebrovascular accident (CVA), unspecified mechanism (CMS/HCC) [I63.9]   Type: THERAPY  Noted: 9/15/2020   10/8/2020   Visits: 6       Subjective   Pt reports: No new complaints.      Objective     See Exercise, Manual, and Modality Logs for complete treatment.     Patient Education: HEP    Assessment/Plan Fatigued post rx. Ataxic gait.      Progress per Plan of Care            Timed:         Manual Therapy:         mins  92065;     Therapeutic Exercise:    23    mins  24932;     Neuromuscular Jessie:    15    mins  02922;    Therapeutic Activity:          mins  71443;     Gait Training:      15     mins  30360;     Ultrasound:          mins  75209;    Ionto                                   mins   23318  Self Care                            mins   44754    Un-Timed:  Electrical Stimulation:         mins  29497 ( );  Traction          mins 15852  Low Eval          Mins  17907  Mod Eval          Mins  90215  High Eval                            Mins  57889  Re-Eval                               mins  89603    Timed Treatment:   53  mins   Total Treatment:     53   mins    Juan Campbell, PT, DPT, OCS  [place] license: [id number]  Physical Therapist

## 2020-10-13 ENCOUNTER — TREATMENT (OUTPATIENT)
Dept: PHYSICAL THERAPY | Facility: CLINIC | Age: 50
End: 2020-10-13

## 2020-10-13 DIAGNOSIS — I63.9 CEREBROVASCULAR ACCIDENT (CVA), UNSPECIFIED MECHANISM (HCC): Primary | ICD-10-CM

## 2020-10-13 DIAGNOSIS — R29.6 FREQUENT FALLS: ICD-10-CM

## 2020-10-13 PROCEDURE — 97112 NEUROMUSCULAR REEDUCATION: CPT | Performed by: PHYSICAL THERAPIST

## 2020-10-13 NOTE — PROGRESS NOTES
Physical Therapy Daily Progress Note    VISIT#: 7    Subjective   Claudy Pabon reports that he is doing well today.       Objective     See Exercise, Manual, and Modality Logs for complete treatment.     Assessment/Plan   Pt continues to demonstrate a circumducted gait on RLE while ambulating with the RW into the clinic today. Pt with several LOB during gait training today with two instances of scissoring as the pt became more fatigued. Pt recovered with MOD A x 1 of therapist with LOB. Static balance activities of tandem stance and SLS were added today with pt demonstrating increased stability with greater repetition.    Plan  Progress per Plan of Care and Progress strengthening /stabilization /functional activity            Timed:         Manual Therapy:         mins  73335;     Therapeutic Exercise:         mins  60848;     Neuromuscular Jessie:    43    mins  42018;    Therapeutic Activity:          mins  68444;     Gait Training:           mins  86926;     Ultrasound:          mins  64997;    Ionto                                   mins   08867  Self Care                            mins   20478    Un-Timed:  Electrical Stimulation:         mins  41277 ( );  Dry Needling          mins self-pay  Traction          mins 15116  Low Eval          Mins  61992  Mod Eval          Mins  73433  High Eval                            Mins  33978  Re-Eval                               mins  01808    Timed Treatment:   43   mins   Total Treatment:     43   mins    Vannesa Iglesias PT, DPT  Physical Therapist

## 2020-10-20 ENCOUNTER — TREATMENT (OUTPATIENT)
Dept: PHYSICAL THERAPY | Facility: CLINIC | Age: 50
End: 2020-10-20

## 2020-10-20 DIAGNOSIS — I63.9 CEREBROVASCULAR ACCIDENT (CVA), UNSPECIFIED MECHANISM (HCC): Primary | ICD-10-CM

## 2020-10-20 DIAGNOSIS — R29.6 FREQUENT FALLS: ICD-10-CM

## 2020-10-20 PROCEDURE — 97110 THERAPEUTIC EXERCISES: CPT | Performed by: PHYSICAL THERAPIST

## 2020-10-20 PROCEDURE — 97116 GAIT TRAINING THERAPY: CPT | Performed by: PHYSICAL THERAPIST

## 2020-10-20 PROCEDURE — 97112 NEUROMUSCULAR REEDUCATION: CPT | Performed by: PHYSICAL THERAPIST

## 2020-10-20 NOTE — PROGRESS NOTES
Physical Therapy Daily Progress Note  Claudy Pabon   1970   Primary Diagnosis: Cerebrovascular accident (CVA), unspecified mechanism (CMS/HCC) [I63.9]   Type: THERAPY  Noted: 9/15/2020   10/20/2020   Visits: 8       Subjective   Pt reports: No falls per pt. HEP going well.      Objective     See Exercise, Manual, and Modality Logs for complete treatment.     Patient Education: HEP    Assessment/Plan Fatigued post rx. Will reassess next visit.      Progress per Plan of Care            Timed:         Manual Therapy:         mins  70178;     Therapeutic Exercise:    15     mins  84455;     Neuromuscular Jessie:    15    mins  46096;    Therapeutic Activity:          mins  26893;     Gait Training:      15     mins  07253;     Ultrasound:          mins  40681;    Ionto                                   mins   82856  Self Care                            mins   35047    Un-Timed:  Electrical Stimulation:         mins  03181 ( );  Traction          mins 20393  Low Eval          Mins  79248  Mod Eval          Mins  35513  High Eval                            Mins  62687  Re-Eval                               mins  21002    Timed Treatment:   45   mins   Total Treatment:     45   mins    Juan Campbell, PT, DPT, OCS  [place] license: [id number]  Physical Therapist

## 2020-10-23 ENCOUNTER — TELEPHONE (OUTPATIENT)
Dept: ORTHOPEDICS | Facility: OTHER | Age: 50
End: 2020-10-23

## 2020-11-25 ENCOUNTER — TRANSCRIBE ORDERS (OUTPATIENT)
Dept: ADMINISTRATIVE | Facility: HOSPITAL | Age: 50
End: 2020-11-25

## 2020-11-25 ENCOUNTER — LAB (OUTPATIENT)
Dept: LAB | Facility: HOSPITAL | Age: 50
End: 2020-11-25

## 2020-11-25 DIAGNOSIS — G25.3 OPSOCLONUS-MYOCLONUS-ATAXIA SYNDROME: ICD-10-CM

## 2020-11-25 DIAGNOSIS — H55.89 OPSOCLONUS-MYOCLONUS-ATAXIA SYNDROME: Primary | ICD-10-CM

## 2020-11-25 DIAGNOSIS — G25.3 OPSOCLONUS-MYOCLONUS-ATAXIA SYNDROME: Primary | ICD-10-CM

## 2020-11-25 DIAGNOSIS — H55.89 OPSOCLONUS-MYOCLONUS-ATAXIA SYNDROME: ICD-10-CM

## 2020-11-25 PROCEDURE — 86256 FLUORESCENT ANTIBODY TITER: CPT

## 2020-11-25 PROCEDURE — 36415 COLL VENOUS BLD VENIPUNCTURE: CPT

## 2020-11-25 PROCEDURE — 83520 IMMUNOASSAY QUANT NOS NONAB: CPT

## 2020-11-25 RX ORDER — APIXABAN 5 MG/1
TABLET, FILM COATED ORAL
Qty: 60 TABLET | Refills: 5 | Status: SHIPPED | OUTPATIENT
Start: 2020-11-25 | End: 2021-06-23

## 2020-12-01 LAB — RNA AB SER-ACNC: 18.4 EU/ML

## 2020-12-02 LAB
HU1 AB TITR SER: NORMAL TITER
PURKINJE CELLS AB TITR SER IF: NORMAL TITER

## 2021-01-20 RX ORDER — ESCITALOPRAM OXALATE 10 MG/1
TABLET ORAL
Qty: 90 TABLET | Refills: 1 | OUTPATIENT
Start: 2021-01-20

## 2021-01-28 RX ORDER — METOPROLOL TARTRATE 50 MG/1
TABLET, FILM COATED ORAL
Qty: 180 TABLET | Refills: 3 | Status: SHIPPED | OUTPATIENT
Start: 2021-01-28 | End: 2021-11-22

## 2021-02-08 RX ORDER — DILTIAZEM HYDROCHLORIDE 60 MG/1
TABLET, FILM COATED ORAL
Qty: 180 TABLET | Refills: 3 | Status: SHIPPED | OUTPATIENT
Start: 2021-02-08 | End: 2021-12-01

## 2021-04-01 RX ORDER — ATORVASTATIN CALCIUM 40 MG/1
40 TABLET, FILM COATED ORAL
Qty: 90 TABLET | Refills: 2 | Status: SHIPPED | OUTPATIENT
Start: 2021-04-01 | End: 2021-11-08

## 2021-04-14 ENCOUNTER — OFFICE VISIT (OUTPATIENT)
Dept: CARDIOLOGY | Facility: CLINIC | Age: 51
End: 2021-04-14

## 2021-04-14 VITALS
SYSTOLIC BLOOD PRESSURE: 110 MMHG | WEIGHT: 201 LBS | DIASTOLIC BLOOD PRESSURE: 78 MMHG | BODY MASS INDEX: 28.77 KG/M2 | HEIGHT: 70 IN | OXYGEN SATURATION: 98 %

## 2021-04-14 DIAGNOSIS — I48.92 PAROXYSMAL ATRIAL FLUTTER (HCC): ICD-10-CM

## 2021-04-14 DIAGNOSIS — I34.0 NON-RHEUMATIC MITRAL REGURGITATION: ICD-10-CM

## 2021-04-14 DIAGNOSIS — I63.9 CEREBROVASCULAR ACCIDENT (CVA), UNSPECIFIED MECHANISM (HCC): Primary | ICD-10-CM

## 2021-04-14 DIAGNOSIS — M54.50 CHRONIC LOW BACK PAIN, UNSPECIFIED BACK PAIN LATERALITY, UNSPECIFIED WHETHER SCIATICA PRESENT: ICD-10-CM

## 2021-04-14 DIAGNOSIS — Z79.01 CHRONIC ANTICOAGULATION: ICD-10-CM

## 2021-04-14 DIAGNOSIS — I10 ESSENTIAL (PRIMARY) HYPERTENSION: Primary | ICD-10-CM

## 2021-04-14 DIAGNOSIS — G89.29 CHRONIC LOW BACK PAIN, UNSPECIFIED BACK PAIN LATERALITY, UNSPECIFIED WHETHER SCIATICA PRESENT: ICD-10-CM

## 2021-04-14 PROCEDURE — 93000 ELECTROCARDIOGRAM COMPLETE: CPT | Performed by: INTERNAL MEDICINE

## 2021-04-14 PROCEDURE — 99214 OFFICE O/P EST MOD 30 MIN: CPT | Performed by: INTERNAL MEDICINE

## 2021-04-14 NOTE — PROGRESS NOTES
Encounter Date:04/14/2021  Last seen 8/12/2020      Patient ID: Claudy Pabon is a 51 y.o. male.    Chief Complaint:    History of atrial flutter  Anticoagulation management  Paroxysmal atrial fibrillation  Hypertension     History of Present Illness     Since I have last seen, the patient has been without any chest discomfort ,shortness of breath, palpitations, dizziness or syncope.  Denies having any headache ,abdominal pain ,nausea, vomiting , diarrhea constipation, loss of weight or loss of appetite.  Denies having any excessive bruising ,hematuria or blood in the stool.    Review of all systems negative except as indicated.    Reviewed ROS.  Assessment and Plan         [[[[[[[[[[[    Impression  ============   - history of atrial flutter.  Patient had flutter ablation 2006 at [facility] by Dr. Gonzalez.     -history of paroxysmal but mostly persistent atrial fibrillation.  Patient is in  atrial fibrillation today.     -anticoagulation - on Eliquis.     - history of stroke from which he partially recovered September 2016     -hypertension -well-controlled.     -history of back surgery and left hip surgery  ==================    Plan  ===============  History of paroxysmal atrial fibrillation flutter-mostly persistent and rate controlled.   EKG today showed atrial fibrillation flutter fibrillation with controlled ventricular response.  Patient is asymptomatic.    Anticoagulation status reviewed.  Patient is on Eliquis.      Hypertension-well controlled 110/78.  Continue diltiazem metoprolol    Dyslipidemia-continue atorvastatin    Patient is not having any angina pectoris or congestive heart failure.  Wife has been brought with the issue of any need for watchman procedure at last visit.  I have discussed and educated her and patient at last visit and I did not think he would be needing watchman procedure at this time since he really does not have any problems with taking anticoagulants.    Medications  were reviewed and updated.  Followup in the office in 6 months.  Further plan will depend on patient's progress.  [[[[[[[[[[[[[[[[[[[[             Diagnosis Plan   1. Essential (primary) hypertension  ECG 12 Lead   2. Chronic anticoagulation  ECG 12 Lead   3. Paroxysmal atrial flutter (CMS/HCC)  ECG 12 Lead   4. Non-rheumatic mitral regurgitation  ECG 12 Lead   LAB RESULTS (LAST 7 DAYS)    CBC        BMP        CMP         BNP        TROPONIN        CoAg        Creatinine Clearance  CrCl cannot be calculated (Patient's most recent lab result is older than the maximum 30 days allowed.).    ABG        Radiology  No radiology results for the last day                The following portions of the patient's history were reviewed and updated as appropriate: allergies, current medications, past family history, past medical history, past social history, past surgical history and problem list.    Review of Systems   Constitutional: Negative for malaise/fatigue.   Cardiovascular: Negative for chest pain, leg swelling, palpitations and syncope.   Respiratory: Negative for shortness of breath.    Skin: Negative for rash.   Gastrointestinal: Negative for nausea and vomiting.   Neurological: Negative for dizziness, light-headedness and numbness.         Current Outpatient Medications:   •  aspirin 81 MG chewable tablet, Chew 81 mg daily., Disp: , Rfl:   •  atorvastatin (LIPITOR) 40 MG tablet, TAKE 1 TABLET BY MOUTH EVERY NIGHT AT BEDTIME., Disp: 90 tablet, Rfl: 2  •  dilTIAZem (CARDIZEM) 60 MG tablet, TAKE 1 TABLET TWICE DAILY, Disp: 180 tablet, Rfl: 3  •  Eliquis 5 MG tablet tablet, TAKE ONE TABLET BY MOUTH TWICE DAILY, Disp: 60 tablet, Rfl: 5  •  escitalopram (Lexapro) 20 MG tablet, Take 1 tablet by mouth Daily., Disp: 90 tablet, Rfl: 0  •  gabapentin (NEURONTIN) 300 MG capsule, TAKE 1 CAPSULE TWICE DAILY, Disp: 60 capsule, Rfl: 3  •  metoprolol tartrate (LOPRESSOR) 50 MG tablet, TAKE 1 TABLET TWICE DAILY, Disp: 180 tablet, Rfl:  "3    No Known Allergies    History reviewed. No pertinent family history.    Past Surgical History:   Procedure Laterality Date   • REVISION OF TOTAL HIP FEMORAL Left        Past Medical History:   Diagnosis Date   • AF (paroxysmal atrial fibrillation) (CMS/HCC)    • Atrial flutter (CMS/HCC)    • Injury of back     spinal fusion   • Stroke (CMS/HCC)     2016       History reviewed. No pertinent family history.    Social History     Socioeconomic History   • Marital status:      Spouse name: Not on file   • Number of children: Not on file   • Years of education: Not on file   • Highest education level: Not on file   Tobacco Use   • Smoking status: Never Smoker   • Smokeless tobacco: Former User     Types: Chew   Substance and Sexual Activity   • Alcohol use: Never           ECG 12 Lead    Date/Time: 4/14/2021 1:49 PM  Performed by: Giancarlo Birch MD  Authorized by: Giancarlo Birch MD   Comparison: compared with previous ECG   Similar to previous ECG  Comparison to previous ECG: Atrial fibrillation with controlled ventricular response normal axis 73/min normal axis normal intervals no ectopy no significant change from 8/12/2020                Objective:       Physical Exam    /78   Ht 177.8 cm (70\")   Wt 91.2 kg (201 lb)   SpO2 98%   BMI 28.84 kg/m²   The patient is alert, oriented and in no distress.    Vital signs as noted above.    Head and neck revealed no carotid bruits or jugular venous distension.  No thyromegaly or lymphadenopathy is present.    Lungs clear.  No wheezing.  Breath sounds are normal bilaterally.    Heart normal first and second heart sounds.  No murmur..  No pericardial rub is present.  No gallop is present.    Abdomen soft and nontender.  No organomegaly is present.    Extremities revealed good peripheral pulses without any pedal edema.    Skin warm and dry.    Musculoskeletal system is grossly normal.    CNS-left-sided weakness.      "

## 2021-04-26 DIAGNOSIS — F41.9 ANXIETY: ICD-10-CM

## 2021-04-26 RX ORDER — ESCITALOPRAM OXALATE 20 MG/1
TABLET ORAL
Qty: 90 TABLET | Refills: 0 | Status: SHIPPED | OUTPATIENT
Start: 2021-04-26 | End: 2021-07-25

## 2021-04-28 ENCOUNTER — TREATMENT (OUTPATIENT)
Dept: PHYSICAL THERAPY | Facility: CLINIC | Age: 51
End: 2021-04-28

## 2021-04-28 DIAGNOSIS — I63.9 CEREBROVASCULAR ACCIDENT (CVA), UNSPECIFIED MECHANISM (HCC): Primary | ICD-10-CM

## 2021-04-28 DIAGNOSIS — R26.9 GAIT DISTURBANCE: ICD-10-CM

## 2021-04-28 DIAGNOSIS — R26.89 BALANCE PROBLEM: ICD-10-CM

## 2021-04-28 PROCEDURE — 97110 THERAPEUTIC EXERCISES: CPT | Performed by: PHYSICAL THERAPIST

## 2021-04-28 PROCEDURE — 97162 PT EVAL MOD COMPLEX 30 MIN: CPT | Performed by: PHYSICAL THERAPIST

## 2021-04-28 NOTE — PROGRESS NOTES
Physical Therapy Initial Evaluation and Plan of Care    Patient: Claudy Pabon   : 1970  Diagnosis/ICD-10 Code:  Cerebrovascular accident (CVA), unspecified mechanism (CMS/[facility]) [I63.9]  Referring practitioner: ITALO Montano    Subjective Evaluation    History of Present Illness  Mechanism of injury: HR: 67  O2 saturation: 98 %  BP: 112/82 mmHG    Subjective comment: Patient is a 51 year old male who presents with balance and gait deficits with history of CVA five years ago.  Reports has lost strength since COVID restrictions as he was not as active.  He reports history of falls but none recently.  Reports still has difficulty with walking and changes in direction as well as stairs.   Patient Occupation: Disabled  Quality of life: good    Pain  Current pain ratin  At best pain ratin  Pain location: Left knee   Symptom course: Had reached baseline function pre COVID and regressed post.             Objective          Strength/Myotome Testing     Left Hip   Planes of Motion   Flexion: 4+  Abduction: 4+    Right Hip   Planes of Motion   Flexion: 4+  Abduction: 5    Left Knee   Flexion: 5  Extension: 5    Right Knee   Flexion: 5  Extension: 5    Left Ankle/Foot   Dorsiflexion: 5  Inversion: 5  Eversion: 5    Right Ankle/Foot   Dorsiflexion: 5  Inversion: 5  Eversion: 5    Ambulation     Comments   Discontinuous stepping with hip IR during stance on R L, decreased arm swing    Functional Assessment     Comments  FTSST: 38.21 seconds - use of UE  Single leg balance - R 1 second L unable without UE support  Mid line reach - R - decreased with limited weight shift  L - decreased with limited weight shift          Assessment & Plan     Assessment  Impairments: abnormal coordination, abnormal gait, activity intolerance, impaired balance, impaired physical strength, lacks appropriate home exercise program and safety issue  Assessment details: Presents with altered gait, limits in balance in single leg  stance/reciprocal motion/reaching across midline, functional strength with sit to stand and activity limits per the above with history of CVA 5 years ago. He would benefit from skilled therapy to address the above and restore to the highest level of prior function or above.   Prognosis: good  Prognosis details: Per prior level of function, patient motivation, family support  Functional Limitations: carrying objects, lifting, pulling and pushing  Goals  Plan Goals: ST. Gait mechanics improved with increased reciprocal arm/opposite leg motion and no forward loss of balance over 2 weeks.   2. Independent and compliant with HEP.  3. No LE pain with progression of dynamic balance activity.     LT. Five Time Sit to Stand time improved to 20 seconds or less over 6 weeks w/o use of UE. .  2. Single leg balance w/o UE support 3-5 seconds by discharge.  3. Able to reach across midline and weight shift safely x 3 w/SBA or less.       Plan  Therapy options: will be seen for skilled physical therapy services  Planned therapy interventions: motor coordination training, neuromuscular re-education, balance/weight-bearing training, functional ROM exercises, strengthening, gait training, home exercise program and therapeutic activities  Frequency: 2x week  Duration in visits: 12  Treatment plan discussed with: patient and family        Timed:           Therapeutic Exercise:    10     mins  92785;       Un-Timed:  Mod Eval     30     Mins  22931      Timed Treatment:   40   mins   Total Treatment:     40   mins    PT SIGNATURE: Jermaine Medina PT, DPT       DATE TREATMENT INITIATED: 2021    Initial Certification  Certification Period: 2021  I certify that the therapy services are furnished while this patient is under my care.  The services outlined above are required by this patient, and will be reviewed every 90 days.     PHYSICIAN: Eugenia John APRN      DATE:     Please sign and return via fax to  [phone].. Thank you, [facility].

## 2021-05-03 ENCOUNTER — TREATMENT (OUTPATIENT)
Dept: PHYSICAL THERAPY | Facility: CLINIC | Age: 51
End: 2021-05-03

## 2021-05-03 DIAGNOSIS — R29.6 FREQUENT FALLS: ICD-10-CM

## 2021-05-03 DIAGNOSIS — I63.9 CEREBROVASCULAR ACCIDENT (CVA), UNSPECIFIED MECHANISM (HCC): Primary | ICD-10-CM

## 2021-05-03 DIAGNOSIS — R26.9 GAIT DISTURBANCE: ICD-10-CM

## 2021-05-03 DIAGNOSIS — R26.89 BALANCE PROBLEM: ICD-10-CM

## 2021-05-03 PROCEDURE — 97110 THERAPEUTIC EXERCISES: CPT | Performed by: PHYSICAL THERAPIST

## 2021-05-03 PROCEDURE — 97112 NEUROMUSCULAR REEDUCATION: CPT | Performed by: PHYSICAL THERAPIST

## 2021-05-03 NOTE — PROGRESS NOTES
Physical Therapy Daily Progress Note    VISIT#: 2    Subjective   Claudy Pabon reports that he is doing well today and that he would like to be able to ambulate IND.     Objective     See Exercise, Manual, and Modality Logs for complete treatment.   Assessment/Plan   Pt performed greater amounts of balance activities today with gait belt donned throughout the session and HHA maintained due to frequent LOB while ambulating without an AD. Pt also performed greater standing exercises with good activity tolerance demonstrated with few rest breaks required.     Plan  Progress per Plan of Care and Progress strengthening /stabilization /functional activity            Timed:         Manual Therapy:         mins  98006;     Therapeutic Exercise:    15     mins  39445;     Neuromuscular Jessie:    25    mins  98078;    Therapeutic Activity:          mins  69830;     Gait Training:           mins  16020;     Ultrasound:          mins  17457;    Ionto                                   mins   04305  Self Care                            mins   10216    Un-Timed:  Electrical Stimulation:         mins  97039 ( );  Dry Needling          mins self-pay  Traction         mins 03577  Low Eval          Mins  21662  Mod Eval          Mins  53856  High Eval                            Mins  13491  Re-Eval                               mins  43418    Timed Treatment:   40   mins   Total Treatment:     40   mins    Vannesa Iglesias PT, DPT  Physical Therapist

## 2021-05-05 ENCOUNTER — TREATMENT (OUTPATIENT)
Dept: PHYSICAL THERAPY | Facility: CLINIC | Age: 51
End: 2021-05-05

## 2021-05-05 DIAGNOSIS — R29.6 FREQUENT FALLS: ICD-10-CM

## 2021-05-05 DIAGNOSIS — I63.9 CEREBROVASCULAR ACCIDENT (CVA), UNSPECIFIED MECHANISM (HCC): Primary | ICD-10-CM

## 2021-05-05 DIAGNOSIS — R26.9 GAIT DISTURBANCE: ICD-10-CM

## 2021-05-05 DIAGNOSIS — R26.89 BALANCE PROBLEM: ICD-10-CM

## 2021-05-05 PROCEDURE — 97110 THERAPEUTIC EXERCISES: CPT | Performed by: PHYSICAL THERAPIST

## 2021-05-05 PROCEDURE — 97530 THERAPEUTIC ACTIVITIES: CPT | Performed by: PHYSICAL THERAPIST

## 2021-05-05 PROCEDURE — 97112 NEUROMUSCULAR REEDUCATION: CPT | Performed by: PHYSICAL THERAPIST

## 2021-05-05 NOTE — PROGRESS NOTES
Physical Therapy Daily Progress Note    VISIT#: 3    Subjective   Claudy Pabon reports that he did well following his last session and that he didn't bring his cane in today.     Objective     See Exercise, Manual, and Modality Logs for complete treatment.     Assessment/Plan   Pt with no AD at the start of the session today with near LOB prior to starting the first exercise, with SBA and CGA required throughout the session to prevent falls. Pt performed sit to stands with increased ease today but requires VC for increased forward lean while standing. Pt also was assisted to his vehicle at the end of the session due to falls risk and was instructed to bring AD next session    Plan  Progress per Plan of Care and Progress strengthening /stabilization /functional activity            Timed:         Manual Therapy:         mins  92459;     Therapeutic Exercise:    15     mins  05932;     Neuromuscular Jessie:    15    mins  67359;    Therapeutic Activity:     15     mins  97232;     Gait Training:           mins  60934;     Ultrasound:          mins  00499;    Ionto                                   mins   12331  Self Care                            mins   56712    Un-Timed:  Electrical Stimulation:        mins  83925 ( );  Dry Needling          mins self-pay  Traction          mins 15715  Low Eval          Mins  53004  Mod Eval         Mins  34186  High Eval                            Mins  11353  Re-Eval                               mins  00028    Timed Treatment:   45   mins   Total Treatment:     45   mins    Vannesa Iglesias PT, DPT  Physical Therapist

## 2021-05-10 ENCOUNTER — TREATMENT (OUTPATIENT)
Dept: PHYSICAL THERAPY | Facility: CLINIC | Age: 51
End: 2021-05-10

## 2021-05-10 DIAGNOSIS — R29.6 FREQUENT FALLS: ICD-10-CM

## 2021-05-10 DIAGNOSIS — R26.9 GAIT DISTURBANCE: ICD-10-CM

## 2021-05-10 DIAGNOSIS — R26.89 BALANCE PROBLEM: ICD-10-CM

## 2021-05-10 DIAGNOSIS — I63.9 CEREBROVASCULAR ACCIDENT (CVA), UNSPECIFIED MECHANISM (HCC): Primary | ICD-10-CM

## 2021-05-10 PROCEDURE — 97110 THERAPEUTIC EXERCISES: CPT | Performed by: PHYSICAL THERAPIST

## 2021-05-10 PROCEDURE — 97112 NEUROMUSCULAR REEDUCATION: CPT | Performed by: PHYSICAL THERAPIST

## 2021-05-10 PROCEDURE — 97530 THERAPEUTIC ACTIVITIES: CPT | Performed by: PHYSICAL THERAPIST

## 2021-05-10 NOTE — PROGRESS NOTES
Physical Therapy Daily Progress Note    VISIT#: 4    Subjective   Claudy Pabon reports that he forgot his cane today with no falls reported  Pain Rating (0/10): 0    Objective     See Exercise, Manual, and Modality Logs for complete treatment.       Assessment/Plan   Pt continues to progress balance activities and demonstrated increased ease to perform sit to stand with greater control on eccentric lowering into the chair. Pt also demonstrated increased balance while performing lateral stepping     Plan  Progress per Plan of Care and Progress strengthening /stabilization /functional activity            Timed:         Manual Therapy:         mins  76095;     Therapeutic Exercise:    15     mins  23142;     Neuromuscular Jessie:    15    mins  14725;    Therapeutic Activity:     15     mins  98144;     Gait Training:           mins  91563;     Ultrasound:          mins  84802;    Ionto                                   mins   11451  Self Care                            mins   21752    Un-Timed:  Electrical Stimulation:        mins  43305 ( );  Dry Needling          mins self-pay  Traction          mins 05892  Low Eval          Mins  35301  Mod Eval         Mins  41727  High Eval                            Mins  50960  Re-Eval                               mins  11009    Timed Treatment:   45   mins   Total Treatment:     45   mins    Vannesa Iglesias PT, DPT  Physical Therapist

## 2021-05-12 ENCOUNTER — TREATMENT (OUTPATIENT)
Dept: PHYSICAL THERAPY | Facility: CLINIC | Age: 51
End: 2021-05-12

## 2021-05-12 DIAGNOSIS — I63.9 CEREBROVASCULAR ACCIDENT (CVA), UNSPECIFIED MECHANISM (HCC): Primary | ICD-10-CM

## 2021-05-12 DIAGNOSIS — R26.89 BALANCE PROBLEM: ICD-10-CM

## 2021-05-12 DIAGNOSIS — R29.6 FREQUENT FALLS: ICD-10-CM

## 2021-05-12 PROCEDURE — 97110 THERAPEUTIC EXERCISES: CPT | Performed by: PHYSICAL THERAPIST

## 2021-05-12 PROCEDURE — 97112 NEUROMUSCULAR REEDUCATION: CPT | Performed by: PHYSICAL THERAPIST

## 2021-05-12 PROCEDURE — 97530 THERAPEUTIC ACTIVITIES: CPT | Performed by: PHYSICAL THERAPIST

## 2021-05-12 NOTE — PROGRESS NOTES
Physical Therapy Daily Progress Note  Visit: 5    Claudy Pabon reports: no new issues today.     Subjective       Objective   See Exercise, Manual, and Modality Logs for complete treatment.       Assessment/Plan     Improving gait mechanics.  Requires guarding for safety with balance progressions.     Plan:    Continue current           Timed:             Therapeutic Exercise:    20     mins  16796;     Neuromuscular Jessie:    15    mins  05615;    Therapeutic Activity:     12     mins  46751;            Timed Treatment:   47   mins   Total Treatment:     47   mins  Jermaine Medina PT, DPT  Physical Therapist

## 2021-05-17 ENCOUNTER — TREATMENT (OUTPATIENT)
Dept: PHYSICAL THERAPY | Facility: CLINIC | Age: 51
End: 2021-05-17

## 2021-05-17 DIAGNOSIS — I63.9 CEREBROVASCULAR ACCIDENT (CVA), UNSPECIFIED MECHANISM (HCC): Primary | ICD-10-CM

## 2021-05-17 DIAGNOSIS — R26.89 BALANCE PROBLEM: ICD-10-CM

## 2021-05-17 PROCEDURE — 97110 THERAPEUTIC EXERCISES: CPT | Performed by: PHYSICAL THERAPIST

## 2021-05-17 PROCEDURE — 97530 THERAPEUTIC ACTIVITIES: CPT | Performed by: PHYSICAL THERAPIST

## 2021-05-17 PROCEDURE — 97112 NEUROMUSCULAR REEDUCATION: CPT | Performed by: PHYSICAL THERAPIST

## 2021-05-17 NOTE — PROGRESS NOTES
Physical Therapy Daily Progress Note  Visit: 6    Claudy Pabon reports: slight fatigue after last visit - no new issues.     Subjective       Objective   See Exercise, Manual, and Modality Logs for complete treatment.       Assessment/Plan     Good tolerance to increased balance demands with reaching across midline and single leg stance with kicking ball - requires guarding for safety.        Timed:              Therapeutic Exercise:    18     mins  46526;     Neuromuscular Jessie:    18    mins  69732;    Therapeutic Activity:     11     mins  89749;            Timed Treatment:   47   mins   Total Treatment:     47   mins  Jermaine Medina PT, DPT  Physical Therapist

## 2021-05-19 ENCOUNTER — TREATMENT (OUTPATIENT)
Dept: PHYSICAL THERAPY | Facility: CLINIC | Age: 51
End: 2021-05-19

## 2021-05-19 PROCEDURE — 97110 THERAPEUTIC EXERCISES: CPT | Performed by: PHYSICAL THERAPIST

## 2021-05-19 PROCEDURE — 97112 NEUROMUSCULAR REEDUCATION: CPT | Performed by: PHYSICAL THERAPIST

## 2021-05-19 PROCEDURE — 97530 THERAPEUTIC ACTIVITIES: CPT | Performed by: PHYSICAL THERAPIST

## 2021-05-19 NOTE — PROGRESS NOTES
Physical Therapy Daily Progress Note  Visit: 7    Claudy Pabon reports: he can tell some improvement in his balance/walking since beginning PT.     Subjective       Objective   See Exercise, Manual, and Modality Logs for complete treatment.       Assessment/Plan     Progressed balance activity with moderate fatigue post treatment.   Improving ability for weight shifting posterior and across midline.     Plan:    Continue current         Timed:             Therapeutic Exercise:    16     mins  01967;     Neuromuscular Jessie:    19    mins  04520;    Therapeutic Activity:     10     mins  12347;           Timed Treatment:   45   mins   Total Treatment:     45   mins  Jermaine Medina PT, DPT  Physical Therapist

## 2021-05-26 ENCOUNTER — TELEPHONE (OUTPATIENT)
Dept: PHYSICAL THERAPY | Facility: CLINIC | Age: 51
End: 2021-05-26

## 2021-05-28 ENCOUNTER — TREATMENT (OUTPATIENT)
Dept: PHYSICAL THERAPY | Facility: CLINIC | Age: 51
End: 2021-05-28

## 2021-05-28 DIAGNOSIS — R26.89 BALANCE PROBLEM: ICD-10-CM

## 2021-05-28 DIAGNOSIS — I63.9 CEREBROVASCULAR ACCIDENT (CVA), UNSPECIFIED MECHANISM (HCC): Primary | ICD-10-CM

## 2021-05-28 PROCEDURE — 97110 THERAPEUTIC EXERCISES: CPT | Performed by: PHYSICAL THERAPIST

## 2021-05-28 PROCEDURE — 97530 THERAPEUTIC ACTIVITIES: CPT | Performed by: PHYSICAL THERAPIST

## 2021-05-28 PROCEDURE — 97112 NEUROMUSCULAR REEDUCATION: CPT | Performed by: PHYSICAL THERAPIST

## 2021-05-28 NOTE — PROGRESS NOTES
Re-Assessment / Progress Note    Patient: Claudy Pabon   : 1970  Diagnosis/ICD-10 Code:  Cerebrovascular accident (CVA), unspecified mechanism (CMS/HCC) [I63.9]  Referring practitioner: ITALO Montano  Date of Initial Visit: Episode Type: THERAPY  Noted: 2021    Today's Date: 2021  Patient seen for 8 sessions.      Subjective:   Claudy Pabon reports: he can tell improvement in his leg strength and balance.  Reports wants to continue PT to improve further.   Subjective Questionnaire: Oswestry: 10%  Clinical Progress: improved  Home Program Compliance: Yes  Treatment has included: therapeutic exercise, neuromuscular re-education, therapeutic activity and gait training    Subjective   Objective          Functional Assessment     Comments  FTSST - W/o UE - 37.1 seconds - with use of UE (tested this way at initial evaluation) 21.5 seconds  Single leg balance - R/L - 1 second each - SBA  Reach across midline - x 3 each with CGA/SBA      Assessment & Plan     Assessment  Assessment details: Has demonstrated progress towards or met all goals with improved gait mechanics, functional LE strength per FTSST and improved ability for weight shifting across midline      Progress toward previous goals: All met or progressed towards    Goals    Short-term goals (STG):   1. Gait mechanics improved with increased reciprocal arm/opposite leg motion and no forward loss of balance over 2 weeks. - Met  2. Independent and compliant with HEP. - Met  3. No LE pain with progression of dynamic balance activity. - Progressed towards     Long-term goals (LTG):   1. Five Time Sit to Stand time improved to 20 seconds or less over 6 weeks w/o use of UE. - Progressed towards   2. Single leg balance w/o UE support 3-5 seconds by discharge. - Progressed towards  3. Able to reach across midline and weight shift safely x 3 w/SBA or less.  - Met            Recommendations: Continue with recommendations to continue with progress  towards LTG  Timeframe: 1 month  Prognosis to achieve goals: good    PT Signature: Jermaine Medina, PT, DPT          Based upon review of the patient's progress and continued therapy plan, it is my medical opinion that Claudy Pabon should continue physical therapy treatment at [facility]  [address] [place] [address] [address] [place] [id number] [phone].        Timed:             Therapeutic Exercise:    15     mins  98243;     Neuromuscular Jessie:    18    mins  45951;    Therapeutic Activity:     11     mins  47210;         Timed Treatment:   44   mins   Total Treatment:     44   mins

## 2021-06-02 ENCOUNTER — TREATMENT (OUTPATIENT)
Dept: PHYSICAL THERAPY | Facility: CLINIC | Age: 51
End: 2021-06-02

## 2021-06-02 DIAGNOSIS — I63.9 CEREBROVASCULAR ACCIDENT (CVA), UNSPECIFIED MECHANISM (HCC): Primary | ICD-10-CM

## 2021-06-02 DIAGNOSIS — R26.89 BALANCE PROBLEM: ICD-10-CM

## 2021-06-02 PROCEDURE — 97112 NEUROMUSCULAR REEDUCATION: CPT | Performed by: PHYSICAL THERAPIST

## 2021-06-02 PROCEDURE — 97110 THERAPEUTIC EXERCISES: CPT | Performed by: PHYSICAL THERAPIST

## 2021-06-02 PROCEDURE — 97530 THERAPEUTIC ACTIVITIES: CPT | Performed by: PHYSICAL THERAPIST

## 2021-06-02 NOTE — PROGRESS NOTES
Physical Therapy Daily Progress Note  Visit: 9    Claudy Pabon reports: fatigued with last visit especially in his calves.     Subjective       Objective   See Exercise, Manual, and Modality Logs for complete treatment.       Assessment/Plan     Balance and LE strength improved - requires guarding for higher level balance activity and some cueing for sequencing with side stepping and squatting when fatigued.    Plan:    Continue current         Timed:              Therapeutic Exercise:    21     mins  53139;     Neuromuscular Jessie:    18    mins  38902;    Therapeutic Activity:     8     mins  30276;           Timed Treatment:   47   mins   Total Treatment:     47   mins  Jermaine Medina PT, DPT  Physical Therapist

## 2021-06-04 ENCOUNTER — TREATMENT (OUTPATIENT)
Dept: PHYSICAL THERAPY | Facility: CLINIC | Age: 51
End: 2021-06-04

## 2021-06-04 DIAGNOSIS — R26.89 BALANCE PROBLEM: ICD-10-CM

## 2021-06-04 DIAGNOSIS — I63.9 CEREBROVASCULAR ACCIDENT (CVA), UNSPECIFIED MECHANISM (HCC): Primary | ICD-10-CM

## 2021-06-04 PROCEDURE — 97530 THERAPEUTIC ACTIVITIES: CPT | Performed by: PHYSICAL THERAPIST

## 2021-06-04 PROCEDURE — 97110 THERAPEUTIC EXERCISES: CPT | Performed by: PHYSICAL THERAPIST

## 2021-06-04 PROCEDURE — 97112 NEUROMUSCULAR REEDUCATION: CPT | Performed by: PHYSICAL THERAPIST

## 2021-06-04 NOTE — PROGRESS NOTES
Physical Therapy Daily Progress Note  Visit: 10    Claudy Pabon reports: no new issues today.     Subjective       Objective   See Exercise, Manual, and Modality Logs for complete treatment.       Assessment/Plan     Sit to stand more difficult but improved ability to reach/step across midline.     Plan:    Continue current           Timed:           Therapeutic Exercise:    13     mins  39540;     Neuromuscular Jessie:    20    mins  89337;    Therapeutic Activity:     9     mins  47911;          Timed Treatment:   42   mins   Total Treatment:     42   mins  Jermaine Medina PT, DPT  Physical Therapist

## 2021-06-09 ENCOUNTER — TELEPHONE (OUTPATIENT)
Dept: PHYSICAL THERAPY | Facility: CLINIC | Age: 51
End: 2021-06-09

## 2021-06-11 ENCOUNTER — TREATMENT (OUTPATIENT)
Dept: PHYSICAL THERAPY | Facility: CLINIC | Age: 51
End: 2021-06-11

## 2021-06-11 DIAGNOSIS — R26.89 BALANCE PROBLEM: ICD-10-CM

## 2021-06-11 DIAGNOSIS — I63.9 CEREBROVASCULAR ACCIDENT (CVA), UNSPECIFIED MECHANISM (HCC): Primary | ICD-10-CM

## 2021-06-11 PROCEDURE — 97530 THERAPEUTIC ACTIVITIES: CPT | Performed by: PHYSICAL THERAPIST

## 2021-06-11 PROCEDURE — 97110 THERAPEUTIC EXERCISES: CPT | Performed by: PHYSICAL THERAPIST

## 2021-06-11 PROCEDURE — 97112 NEUROMUSCULAR REEDUCATION: CPT | Performed by: PHYSICAL THERAPIST

## 2021-06-11 NOTE — PROGRESS NOTES
Physical Therapy Daily Progress Note  Visit: 11    Claudy Pabon reports: no new issues today.  Reports wants to focus on improving gait in addition to current activities.     Subjective       Objective   See Exercise, Manual, and Modality Logs for complete treatment.       Assessment/Plan     Balance and LE strength improving.     Plan:    More focus on gait quality/endurance next visit.            Timed:             Therapeutic Exercise:    14     mins  89853;     Neuromuscular Jessie:    22    mins  24917;    Therapeutic Activity:     8     mins  94109;            Timed Treatment:   44   mins   Total Treatment:     44   mins  Jermaine Medina PT, DPT  Physical Therapist

## 2021-06-16 ENCOUNTER — TREATMENT (OUTPATIENT)
Dept: PHYSICAL THERAPY | Facility: CLINIC | Age: 51
End: 2021-06-16

## 2021-06-16 DIAGNOSIS — I63.9 CEREBROVASCULAR ACCIDENT (CVA), UNSPECIFIED MECHANISM (HCC): Primary | ICD-10-CM

## 2021-06-16 DIAGNOSIS — R26.89 BALANCE PROBLEM: ICD-10-CM

## 2021-06-16 PROCEDURE — 97110 THERAPEUTIC EXERCISES: CPT | Performed by: PHYSICAL THERAPIST

## 2021-06-16 PROCEDURE — 97112 NEUROMUSCULAR REEDUCATION: CPT | Performed by: PHYSICAL THERAPIST

## 2021-06-16 PROCEDURE — 97116 GAIT TRAINING THERAPY: CPT | Performed by: PHYSICAL THERAPIST

## 2021-06-16 NOTE — PROGRESS NOTES
Physical Therapy Daily Progress Note  Visit: 12    Claudy Pabon reports: no new issues today.     Subjective       Objective   See Exercise, Manual, and Modality Logs for complete treatment.       Assessment/Plan     More focus on gait today with some difficulty with walking/turning in out of cones but improved with increased distance between.  Required guarding for safety.  Overall continued improvement in  LE strength, balance, gait quality.     Plan:    Continue current.         Timed:             Therapeutic Exercise:    17     mins  07286;     Neuromuscular Jessie:    15    mins  86547;      Gait Trainin     mins  13541;         Timed Treatment:   45   mins   Total Treatment:     45   mins  Jermaine Medina PT, DPT  Physical Therapist

## 2021-06-18 ENCOUNTER — TREATMENT (OUTPATIENT)
Dept: PHYSICAL THERAPY | Facility: CLINIC | Age: 51
End: 2021-06-18

## 2021-06-18 DIAGNOSIS — R26.89 BALANCE PROBLEM: ICD-10-CM

## 2021-06-18 DIAGNOSIS — I63.9 CEREBROVASCULAR ACCIDENT (CVA), UNSPECIFIED MECHANISM (HCC): Primary | ICD-10-CM

## 2021-06-18 PROCEDURE — 97116 GAIT TRAINING THERAPY: CPT | Performed by: PHYSICAL THERAPIST

## 2021-06-18 PROCEDURE — 97112 NEUROMUSCULAR REEDUCATION: CPT | Performed by: PHYSICAL THERAPIST

## 2021-06-18 PROCEDURE — 97110 THERAPEUTIC EXERCISES: CPT | Performed by: PHYSICAL THERAPIST

## 2021-06-18 NOTE — PROGRESS NOTES
Physical Therapy Daily Progress Note  Visit: 13    Claudy Pabon reports: did well with activity last visit - fatigued but no pain increased pain in his back/legs.     Subjective       Objective   See Exercise, Manual, and Modality Logs for complete treatment.       Assessment/Plan     More stability with gait in/out of cones and retro walking.  Ability to safety shift weight posteriorly and reach across midline continue to increase.     Plan:    Continue current           Timed:           Therapeutic Exercise:    18     mins  [id number];     Neuromuscular Jessie:    15    mins  [id number];    Gait Trainin     mins  [id number];         Timed Treatment:   45   mins   Total Treatment:     45   mins  Jermaine Medina, PT  Physical Therapist

## 2021-06-23 ENCOUNTER — TREATMENT (OUTPATIENT)
Dept: PHYSICAL THERAPY | Facility: CLINIC | Age: 51
End: 2021-06-23

## 2021-06-23 DIAGNOSIS — R26.89 BALANCE PROBLEM: ICD-10-CM

## 2021-06-23 DIAGNOSIS — I63.9 CEREBROVASCULAR ACCIDENT (CVA), UNSPECIFIED MECHANISM (HCC): Primary | ICD-10-CM

## 2021-06-23 PROCEDURE — 97112 NEUROMUSCULAR REEDUCATION: CPT | Performed by: PHYSICAL THERAPIST

## 2021-06-23 PROCEDURE — 97110 THERAPEUTIC EXERCISES: CPT | Performed by: PHYSICAL THERAPIST

## 2021-06-23 PROCEDURE — 97116 GAIT TRAINING THERAPY: CPT | Performed by: PHYSICAL THERAPIST

## 2021-06-23 RX ORDER — APIXABAN 5 MG/1
TABLET, FILM COATED ORAL
Qty: 60 TABLET | Refills: 5 | Status: SHIPPED | OUTPATIENT
Start: 2021-06-23 | End: 2021-12-18

## 2021-06-23 NOTE — PROGRESS NOTES
Physical Therapy Daily Progress Note  Visit: 14    Claudy Pabon reports: no new issues today    Subjective       Objective   See Exercise, Manual, and Modality Logs for complete treatment.       Assessment/Plan     Improving ability for dynamic balance challenges and gait.    Plan:    Re-assess next visit               Timed:             Therapeutic Exercise:    12     mins  [id number];     Neuromuscular Jessie:    18    mins  [id number];      Gait Training:      15     mins  [id number];       Timed Treatment:   45   mins   Total Treatment:     45   mins  Jermaine Medina PT, DPTff  Physical Therapist

## 2021-06-25 ENCOUNTER — TREATMENT (OUTPATIENT)
Dept: PHYSICAL THERAPY | Facility: CLINIC | Age: 51
End: 2021-06-25

## 2021-06-25 DIAGNOSIS — I63.9 CEREBROVASCULAR ACCIDENT (CVA), UNSPECIFIED MECHANISM (HCC): Primary | ICD-10-CM

## 2021-06-25 DIAGNOSIS — R26.89 BALANCE PROBLEM: ICD-10-CM

## 2021-06-25 PROCEDURE — 97116 GAIT TRAINING THERAPY: CPT | Performed by: PHYSICAL THERAPIST

## 2021-06-25 PROCEDURE — 97110 THERAPEUTIC EXERCISES: CPT | Performed by: PHYSICAL THERAPIST

## 2021-06-25 PROCEDURE — 97112 NEUROMUSCULAR REEDUCATION: CPT | Performed by: PHYSICAL THERAPIST

## 2021-06-25 NOTE — PROGRESS NOTES
Re-Assessment / Progress Note    Patient: Claudy Pabon   : 1970  Diagnosis/ICD-10 Code:  Cerebrovascular accident (CVA), unspecified mechanism (CMS/HCC) [I63.9]  Referring practitioner: ITALO Montano  Date of Initial Visit: Episode Type: THERAPY  Noted: 2021    Today's Date: 2021  Patient seen for 15 sessions.      Subjective:   Claudy Pabon reports: he can tell improvement in his ability to stand but still has difficulty from lower chairs.  He reports his wife has commented recently on how his walking ability has improved since beginning PT.  Subjective Questionnaire: Oswestry: 10% - Main treatment now balance/LE strength  Clinical Progress: improved  Home Program Compliance: Yes  Treatment has included: therapeutic exercise, neuromuscular re-education, manual therapy, therapeutic activity and gait training    Subjective   Objective          Functional Assessment     Comments  Five Time Sit to Stand - 17.08 seconds with use of UE on chair, 35 w/o, 19.87 seconds w/o UE with use of airex pad in chair  3 minute walk test - 390 ft SBA/CGA w/o use of AD  Single leg balance R 2 seconds L 4 seconds - best of 3 trials      Assessment & Plan     Assessment  Assessment details: Has demonstrated good improvement in gait quality , functional LE strength and balance per FTSST and single leg balance assessment, improved ability for posterior weight shifting and crossing midline to decrease fall risk.  Has shown significant improvement in the past several weeks with increased dynamic challenges in gait with plan to continue until max improvement reached or able to transition to HEP only with gym program       Progress toward previous goals: all met or progressed towards    Goals    Short-term goals (STG):   1. Gait mechanics improved with increased reciprocal arm/opposite leg motion and no forward loss of balance over 2 weeks. - Met  2. Independent and compliant with HEP. - Met  3. No LE pain with  progression of dynamic balance activity. - Met    Long-term goals (LTG):   1. Five Time Sit to Stand time improved to 20 seconds or less over 6 weeks w/o use of UE. - Progressed towards   2. Single leg balance w/o UE support 3-5 seconds by discharge. -Progressed towards  3. Able to reach across midline and weight shift safely x 3 w/SBA or less.  - Met     Recommendations: Continue with recommendations To continue PT 2 x per week for 3 weeks  Timeframe: 3 weeks  Prognosis to achieve goals: good    PT Signature: Jermaine Medina, PT          Based upon review of the patient's progress and continued therapy plan, it is my medical opinion that Claudy Pabon should continue physical therapy treatment at [facility]  [address] [place] [address] [address] [place] [id number] [phone].        Timed:             Therapeutic Exercise:    10     mins  46595;     Neuromuscular Jessie:    15    mins  27323;    Gait Trainin     mins  55123;         Timed Treatment:   45   mins   Total Treatment:     45   mins

## 2021-06-30 ENCOUNTER — TREATMENT (OUTPATIENT)
Dept: PHYSICAL THERAPY | Facility: CLINIC | Age: 51
End: 2021-06-30

## 2021-06-30 DIAGNOSIS — I63.9 CEREBROVASCULAR ACCIDENT (CVA), UNSPECIFIED MECHANISM (HCC): Primary | ICD-10-CM

## 2021-06-30 DIAGNOSIS — R26.89 BALANCE PROBLEM: ICD-10-CM

## 2021-06-30 PROCEDURE — 97112 NEUROMUSCULAR REEDUCATION: CPT | Performed by: PHYSICAL THERAPIST

## 2021-06-30 PROCEDURE — 97116 GAIT TRAINING THERAPY: CPT | Performed by: PHYSICAL THERAPIST

## 2021-06-30 PROCEDURE — 97110 THERAPEUTIC EXERCISES: CPT | Performed by: PHYSICAL THERAPIST

## 2021-06-30 NOTE — PROGRESS NOTES
Physical Therapy Daily Progress Note  Visit: 16    Claudy Pabon reports: no new issues - did well w/o excessive fatigue last visit.     Subjective       Objective   See Exercise, Manual, and Modality Logs for complete treatment.       Assessment/Plan     Gradual improvement gait mechanics including multi-directional, start/stop, variable speeds.  Still requires guarding for safety with higher level activity but decreased over time.     Plan:    Continue current         Timed:            Therapeutic Exercise:    13     mins  [id number];     Neuromuscular Jsesie:    18    mins  85973;     Gait Training:      15     mins  34959;         Timed Treatment:   46   mins   Total Treatment:     46   mins  Jermaine Medina, PT, DPT  Physical Therapist

## 2021-07-24 DIAGNOSIS — F41.9 ANXIETY: ICD-10-CM

## 2021-07-25 RX ORDER — ESCITALOPRAM OXALATE 20 MG/1
TABLET ORAL
Qty: 90 TABLET | Refills: 0 | Status: SHIPPED | OUTPATIENT
Start: 2021-07-25 | End: 2021-10-24

## 2021-10-20 ENCOUNTER — OFFICE VISIT (OUTPATIENT)
Dept: CARDIOLOGY | Facility: CLINIC | Age: 51
End: 2021-10-20

## 2021-10-20 VITALS
SYSTOLIC BLOOD PRESSURE: 112 MMHG | OXYGEN SATURATION: 97 % | WEIGHT: 206 LBS | BODY MASS INDEX: 29.49 KG/M2 | HEIGHT: 70 IN | HEART RATE: 83 BPM | DIASTOLIC BLOOD PRESSURE: 76 MMHG

## 2021-10-20 DIAGNOSIS — Z79.01 CHRONIC ANTICOAGULATION: Primary | ICD-10-CM

## 2021-10-20 DIAGNOSIS — I34.0 NON-RHEUMATIC MITRAL REGURGITATION: ICD-10-CM

## 2021-10-20 DIAGNOSIS — I10 ESSENTIAL (PRIMARY) HYPERTENSION: ICD-10-CM

## 2021-10-20 DIAGNOSIS — R00.2 PALPITATIONS: ICD-10-CM

## 2021-10-20 DIAGNOSIS — I48.92 PAROXYSMAL ATRIAL FLUTTER (HCC): ICD-10-CM

## 2021-10-20 PROCEDURE — 99214 OFFICE O/P EST MOD 30 MIN: CPT | Performed by: INTERNAL MEDICINE

## 2021-10-20 PROCEDURE — 93000 ELECTROCARDIOGRAM COMPLETE: CPT | Performed by: INTERNAL MEDICINE

## 2021-10-20 RX ORDER — LEVETIRACETAM 250 MG/1
TABLET ORAL
COMMUNITY
Start: 2021-10-11 | End: 2022-10-26

## 2021-10-20 NOTE — PROGRESS NOTES
Encounter Date:10/20/2021  Last seen 4/14/2021      Patient ID: Claudy Pabon is a 51 y.o. male.    Chief Complaint:    History of atrial flutter  Anticoagulation management  Paroxysmal atrial fibrillation  Hypertension     History of Present Illness  Patient recently was  diagnosed to have movement disorder and was started on Keppra.  Since I have last seen, the patient has been without any chest discomfort ,shortness of breath, palpitations, dizziness or syncope.  Denies having any headache ,abdominal pain ,nausea, vomiting , diarrhea constipation, loss of weight or loss of appetite.  Denies having any excessive bruising ,hematuria or blood in the stool.    Review of all systems negative except as indicated.    Reviewed ROS.  Assessment and Plan         [[[[[[[[[[[    Impression  ============   - history of atrial flutter.  Patient had flutter ablation 2006 at [facility] by Dr. Gonzalez.     -history of paroxysmal but mostly persistent atrial fibrillation.  Patient is in  atrial fibrillation today.     -anticoagulation - on Eliquis.     - history of stroke from which he partially recovered September 2016     -hypertension -well-controlled.    -History of movement disorder on Keppra     -history of back surgery and left hip surgery  ==================    Plan  ===============  History of paroxysmal atrial fibrillation flutter-mostly persistent and rate controlled.   EKG today showed atrial fibrillation flutter fibrillation with controlled ventricular response.  atient is asymptomatic.     Anticoagulation status reviewed.  Patient is on Eliquis.        Hypertension-well controlled 110/78.  Continue diltiazem metoprolol     Dyslipidemia-continue atorvastatin    Movement disorder-on Keppra     Patient is not having any angina pectoris or congestive heart failure.    Medications were reviewed and updated.  Followup in the office in 6 months.  Further plan will depend on patient's  progress.  [[[[[[[[[[[[[[[[[[[[                   Diagnosis Plan   1. Chronic anticoagulation  ECG 12 Lead   2. Paroxysmal atrial flutter (HCC)  ECG 12 Lead   3. Palpitations  ECG 12 Lead   4. Essential (primary) hypertension  ECG 12 Lead   5. Non-rheumatic mitral regurgitation  ECG 12 Lead   LAB RESULTS (LAST 7 DAYS)    CBC        BMP        CMP         BNP        TROPONIN        CoAg        Creatinine Clearance  CrCl cannot be calculated (Patient's most recent lab result is older than the maximum 30 days allowed.).    ABG        Radiology  No radiology results for the last day                The following portions of the patient's history were reviewed and updated as appropriate: allergies, current medications, past family history, past medical history, past social history, past surgical history and problem list.    Review of Systems   Constitutional: Negative for malaise/fatigue.   Cardiovascular: Negative for chest pain, leg swelling, palpitations and syncope.   Respiratory: Negative for shortness of breath.    Skin: Negative for rash.   Gastrointestinal: Negative for nausea and vomiting.   Neurological: Negative for dizziness, light-headedness and numbness.   All other systems reviewed and are negative.        Current Outpatient Medications:   •  aspirin 81 MG chewable tablet, Chew 81 mg daily., Disp: , Rfl:   •  atorvastatin (LIPITOR) 40 MG tablet, TAKE 1 TABLET BY MOUTH EVERY NIGHT AT BEDTIME., Disp: 90 tablet, Rfl: 2  •  dilTIAZem (CARDIZEM) 60 MG tablet, TAKE 1 TABLET TWICE DAILY, Disp: 180 tablet, Rfl: 3  •  Eliquis 5 MG tablet tablet, TAKE ONE TABLET BY MOUTH TWICE DAILY, Disp: 60 tablet, Rfl: 5  •  escitalopram (LEXAPRO) 20 MG tablet, TAKE ONE TABLET BY MOUTH EVERY DAY, Disp: 90 tablet, Rfl: 0  •  levETIRAcetam (KEPPRA) 250 MG tablet, Take 1 tablet (250 mg total) by mouth 2 (two) times a day for 14 days, THEN 2 tablets (500 mg total) 2 (two) times a day., Disp: , Rfl:   •  metoprolol tartrate (LOPRESSOR)  "50 MG tablet, TAKE 1 TABLET TWICE DAILY, Disp: 180 tablet, Rfl: 3    No Known Allergies    No family history on file.    Past Surgical History:   Procedure Laterality Date   • REVISION OF TOTAL HIP FEMORAL Left        Past Medical History:   Diagnosis Date   • AF (paroxysmal atrial fibrillation) (HCC)    • Atrial flutter (HCC)    • Injury of back     spinal fusion   • Stroke (HCC)     2016       No family history on file.    Social History     Socioeconomic History   • Marital status:    Tobacco Use   • Smoking status: Never Smoker   • Smokeless tobacco: Former User     Types: Chew   Vaping Use   • Vaping Use: Never used   Substance and Sexual Activity   • Alcohol use: Never   • Drug use: Never   • Sexual activity: Defer           ECG 12 Lead    Date/Time: 10/20/2021 1:11 PM  Performed by: Giancarlo Birch MD  Authorized by: Giancarlo Birch MD   Comparison: compared with previous ECG   Similar to previous ECG  Comparison to previous ECG: Atrial fibrillation with controlled ventricular response 74/min nonspecific ST-T wave changes normal axis normal intervals no ectopy no significant change from 4/14/2021                Objective:       Physical Exam    /76 (BP Location: Right arm, Patient Position: Sitting, Cuff Size: Large Adult)   Pulse 83   Ht 177.8 cm (70\")   Wt 93.4 kg (206 lb)   SpO2 97%   BMI 29.56 kg/m²   The patient is alert, oriented and in no distress.    Vital signs as noted above.    Head and neck revealed no carotid bruits or jugular venous distension.  No thyromegaly or lymphadenopathy is present.    Lungs clear.  No wheezing.  Breath sounds are normal bilaterally.    Heart normal first and second heart sounds.  No murmur..  No pericardial rub is present.  No gallop is present.    Abdomen soft and nontender.  No organomegaly is present.    Extremities revealed good peripheral pulses without any pedal edema.    Skin warm and dry.    Musculoskeletal system is grossly normal.    CNS " grossly normal.    Reviewed and unchanged from last visit.

## 2021-10-23 DIAGNOSIS — F41.9 ANXIETY: ICD-10-CM

## 2021-10-24 RX ORDER — ESCITALOPRAM OXALATE 20 MG/1
TABLET ORAL
Qty: 90 TABLET | Refills: 0 | Status: SHIPPED | OUTPATIENT
Start: 2021-10-24 | End: 2022-02-10 | Stop reason: SDUPTHER

## 2021-11-08 RX ORDER — ATORVASTATIN CALCIUM 40 MG/1
40 TABLET, FILM COATED ORAL
Qty: 90 TABLET | Refills: 2 | Status: SHIPPED | OUTPATIENT
Start: 2021-11-08 | End: 2022-06-22

## 2021-11-08 NOTE — TELEPHONE ENCOUNTER
Rx Refill Note  Requested Prescriptions     Pending Prescriptions Disp Refills   • atorvastatin (LIPITOR) 40 MG tablet [Pharmacy Med Name: ATORVASTATIN CALCIUM 40 MG Tablet] 90 tablet 2     Sig: TAKE 1 TABLET BY MOUTH EVERY NIGHT AT BEDTIME.      Last office visit with prescribing clinician: 10/20/2021      Next office visit with prescribing clinician: 4/27/2022            Anabel Garza MA  11/08/21, 08:25 EST

## 2021-11-22 RX ORDER — METOPROLOL TARTRATE 50 MG/1
TABLET, FILM COATED ORAL
Qty: 180 TABLET | Refills: 3 | Status: SHIPPED | OUTPATIENT
Start: 2021-11-22 | End: 2022-09-15

## 2021-11-22 NOTE — TELEPHONE ENCOUNTER
Rx Refill Note  Requested Prescriptions     Pending Prescriptions Disp Refills   • metoprolol tartrate (LOPRESSOR) 50 MG tablet [Pharmacy Med Name: METOPROLOL TARTRATE 50 MG Tablet] 180 tablet 3     Sig: TAKE 1 TABLET TWICE DAILY      Last office visit with prescribing clinician: 10/20/2021      Next office visit with prescribing clinician: 4/27/2022            Anabel Garza MA  11/22/21, 08:32 EST

## 2021-12-01 RX ORDER — DILTIAZEM HYDROCHLORIDE 60 MG/1
TABLET, FILM COATED ORAL
Qty: 180 TABLET | Refills: 3 | Status: SHIPPED | OUTPATIENT
Start: 2021-12-01 | End: 2022-08-08

## 2021-12-01 NOTE — TELEPHONE ENCOUNTER
Rx Refill Note  Requested Prescriptions     Pending Prescriptions Disp Refills   • dilTIAZem (CARDIZEM) 60 MG tablet [Pharmacy Med Name: DILTIAZEM HCL 60 MG Tablet] 180 tablet 3     Sig: TAKE 1 TABLET TWICE DAILY      Last office visit with prescribing clinician: 10/20/2021      Next office visit with prescribing clinician: 4/27/2022            Anabel Garza MA  12/01/21, 08:19 EST

## 2021-12-18 RX ORDER — APIXABAN 5 MG/1
TABLET, FILM COATED ORAL
Qty: 60 TABLET | Refills: 5 | Status: SHIPPED | OUTPATIENT
Start: 2021-12-18 | End: 2022-02-22 | Stop reason: SDUPTHER

## 2021-12-18 NOTE — TELEPHONE ENCOUNTER
Rx Refill Note  Requested Prescriptions     Pending Prescriptions Disp Refills   • Eliquis 5 MG tablet tablet [Pharmacy Med Name: Eliquis 5 mg tablet] 60 tablet 5     Sig: TAKE ONE TABLET BY MOUTH TWICE DAILY      Last office visit with prescribing clinician: 10/20/2021      Next office visit with prescribing clinician: 4/27/2022            Peggy Gavin MA  12/18/21, 15:27 EST

## 2022-02-10 DIAGNOSIS — F41.9 ANXIETY: ICD-10-CM

## 2022-02-10 RX ORDER — ESCITALOPRAM OXALATE 20 MG/1
20 TABLET ORAL DAILY
Qty: 90 TABLET | Refills: 1 | Status: SHIPPED | OUTPATIENT
Start: 2022-02-10 | End: 2022-09-28

## 2022-02-14 DIAGNOSIS — R06.83 SNORING: Primary | ICD-10-CM

## 2022-02-22 NOTE — TELEPHONE ENCOUNTER
Rx Refill Note  Requested Prescriptions     Pending Prescriptions Disp Refills   • apixaban (Eliquis) 5 MG tablet tablet 180 tablet 3     Sig: Take 1 tablet by mouth 2 (Two) Times a Day.      Last office visit with prescribing clinician: 10/20/2021      Next office visit with prescribing clinician: 4/27/2022            Anabel Garza MA  02/22/22, 11:39 EST

## 2022-04-25 ENCOUNTER — OFFICE VISIT (OUTPATIENT)
Dept: SLEEP MEDICINE | Facility: CLINIC | Age: 52
End: 2022-04-25

## 2022-04-25 VITALS
HEIGHT: 70 IN | HEART RATE: 98 BPM | OXYGEN SATURATION: 96 % | DIASTOLIC BLOOD PRESSURE: 86 MMHG | WEIGHT: 206 LBS | SYSTOLIC BLOOD PRESSURE: 135 MMHG | BODY MASS INDEX: 29.49 KG/M2

## 2022-04-25 DIAGNOSIS — G47.10 HYPERSOMNIA: ICD-10-CM

## 2022-04-25 DIAGNOSIS — G47.8 NON-RESTORATIVE SLEEP: ICD-10-CM

## 2022-04-25 DIAGNOSIS — R06.83 SNORING: ICD-10-CM

## 2022-04-25 DIAGNOSIS — G47.30 SLEEP APNEA, UNSPECIFIED TYPE: Primary | ICD-10-CM

## 2022-04-25 DIAGNOSIS — I48.0 PAROXYSMAL ATRIAL FIBRILLATION: ICD-10-CM

## 2022-04-25 DIAGNOSIS — E66.3 OVERWEIGHT WITH BODY MASS INDEX (BMI) 25.0-29.9: ICD-10-CM

## 2022-04-25 PROCEDURE — G0463 HOSPITAL OUTPT CLINIC VISIT: HCPCS

## 2022-04-25 PROCEDURE — 99204 OFFICE O/P NEW MOD 45 MIN: CPT | Performed by: FAMILY MEDICINE

## 2022-06-22 RX ORDER — ATORVASTATIN CALCIUM 40 MG/1
40 TABLET, FILM COATED ORAL
Qty: 90 TABLET | Refills: 1 | Status: SHIPPED | OUTPATIENT
Start: 2022-06-22 | End: 2023-01-30

## 2022-06-22 NOTE — TELEPHONE ENCOUNTER
Rx Refill Note  Requested Prescriptions     Pending Prescriptions Disp Refills   • atorvastatin (LIPITOR) 40 MG tablet [Pharmacy Med Name: ATORVASTATIN CALCIUM 40 MG Tablet] 90 tablet 1     Sig: TAKE 1 TABLET BY MOUTH EVERY NIGHT AT BEDTIME.      Last office visit with prescribing clinician: 10/20/2021      Next office visit with prescribing clinician: Visit date not found            Anabel Garza MA  06/22/22, 08:53 EDT     Lipid Panel (09/09/2020 13:56)

## 2022-08-08 RX ORDER — DILTIAZEM HYDROCHLORIDE 60 MG/1
TABLET, FILM COATED ORAL
Qty: 180 TABLET | Refills: 3 | Status: SHIPPED | OUTPATIENT
Start: 2022-08-08

## 2022-08-08 NOTE — TELEPHONE ENCOUNTER
Rx Refill Note  Requested Prescriptions     Pending Prescriptions Disp Refills   • dilTIAZem (CARDIZEM) 60 MG tablet [Pharmacy Med Name: DILTIAZEM HCL 60 MG Tablet] 180 tablet 3     Sig: TAKE 1 TABLET TWICE DAILY      Last office visit with prescribing clinician: 10/20/2021      Next office visit with prescribing clinician: Visit date not found            SHANON MARTIN MA  08/08/22, 08:28 EDT

## 2022-09-15 RX ORDER — METOPROLOL TARTRATE 50 MG/1
TABLET, FILM COATED ORAL
Qty: 180 TABLET | Refills: 3 | Status: SHIPPED | OUTPATIENT
Start: 2022-09-15

## 2022-09-15 NOTE — TELEPHONE ENCOUNTER
Rx Refill Note  Requested Prescriptions     Pending Prescriptions Disp Refills   • metoprolol tartrate (LOPRESSOR) 50 MG tablet [Pharmacy Med Name: METOPROLOL TARTRATE 50 MG Tablet] 180 tablet 3     Sig: TAKE 1 TABLET TWICE DAILY      Last office visit with prescribing clinician: 10/20/2021      Next office visit with prescribing clinician: 9/28/2022            SHANON MARTIN MA  09/15/22, 08:36 EDT

## 2022-09-23 NOTE — PROGRESS NOTES
Encounter Date:09/28/2022    Last seen 10/28/2021      Patient ID: Claudy Pabon is a 52 y.o. male.    Chief Complaint:    History of atrial flutter  Anticoagulation management  Persistent atrial fibrillation  Hypertension     History of Present Illness  Since I have last seen, the patient has been without any chest discomfort ,shortness of breath, palpitations, dizziness or syncope.  Denies having any headache ,abdominal pain ,nausea, vomiting , diarrhea constipation, loss of weight or loss of appetite.  Denies having any excessive bruising ,hematuria or blood in the stool.    Review of all systems negative except as indicated.    Reviewed ROS.    Assessment and Plan         [[[[[[[[[[[    Impression  ============  -Persistent atrial fibrillation     - history of atrial flutter.  Patient had flutter ablation 2006 at [facility] by Dr. Gonzalez.     -anticoagulation - on Eliquis.     - history of stroke from which he partially recovered September 2016     -hypertension -well-controlled.     -History of movement disorder on Keppra     -history of back surgery and left hip surgery  ==================    Plan  ===============  Persistent atrial fibrillation  EKG 9/28/2022-atrial fibrillation    Anticoagulation status reviewed.  Patient is on Eliquis.  Observe for toxic effects.    Hypertension-well controlled  125/83  Continue diltiazem metoprolol     Dyslipidemia-continue atorvastatin     Movement disorder-on Keppra     Patient is not having any angina pectoris or congestive heart failure.     Medications were reviewed and updated.    Followup in the office in 6 months.  Further plan will depend on patient's progress.  [[[[[[[[[[[[[[[[[[[[                      Diagnosis Plan   1. Chronic anticoagulation     2. Paroxysmal atrial flutter (HCC)     3. Palpitations     4. Essential (primary) hypertension     5. Non-rheumatic mitral regurgitation     LAB RESULTS (LAST 7 DAYS)    CBC        BMP        CMP          BNP        TROPONIN        CoAg        Creatinine Clearance  CrCl cannot be calculated (Patient's most recent lab result is older than the maximum 30 days allowed.).    ABG        Radiology  No radiology results for the last day                The following portions of the patient's history were reviewed and updated as appropriate: allergies, current medications, past family history, past medical history, past social history, past surgical history and problem list.    Review of Systems   Constitutional: Negative for fever and malaise/fatigue.   Cardiovascular: Negative for chest pain, dyspnea on exertion, leg swelling, palpitations and syncope.   Respiratory: Negative for cough and shortness of breath.    Skin: Negative for rash.   Gastrointestinal: Negative for abdominal pain, nausea and vomiting.   Neurological: Negative for dizziness, focal weakness, headaches, light-headedness and numbness.   All other systems reviewed and are negative.        Current Outpatient Medications:   •  apixaban (Eliquis) 5 MG tablet tablet, Take 1 tablet by mouth 2 (Two) Times a Day., Disp: 180 tablet, Rfl: 3  •  aspirin 81 MG chewable tablet, Chew 81 mg daily., Disp: , Rfl:   •  atorvastatin (LIPITOR) 40 MG tablet, TAKE 1 TABLET BY MOUTH EVERY NIGHT AT BEDTIME., Disp: 90 tablet, Rfl: 1  •  dilTIAZem (CARDIZEM) 60 MG tablet, TAKE 1 TABLET TWICE DAILY, Disp: 180 tablet, Rfl: 3  •  levETIRAcetam (KEPPRA) 250 MG tablet, Take 1 tablet (250 mg total) by mouth 2 (two) times a day for 14 days, THEN 2 tablets (500 mg total) 2 (two) times a day., Disp: , Rfl:   •  metoprolol tartrate (LOPRESSOR) 50 MG tablet, TAKE 1 TABLET TWICE DAILY, Disp: 180 tablet, Rfl: 3    No Known Allergies    History reviewed. No pertinent family history.    Past Surgical History:   Procedure Laterality Date   • REVISION OF TOTAL HIP FEMORAL Left        Past Medical History:   Diagnosis Date   • AF (paroxysmal atrial fibrillation) (HCC)    • Atrial flutter (HCC)  "   • Injury of back     spinal fusion   • Stroke (HCC)     2016       History reviewed. No pertinent family history.    Social History     Socioeconomic History   • Marital status:    Tobacco Use   • Smoking status: Never Smoker   • Smokeless tobacco: Former User     Types: Chew   Vaping Use   • Vaping Use: Never used   Substance and Sexual Activity   • Alcohol use: Never   • Drug use: Never   • Sexual activity: Defer           ECG 12 Lead    Date/Time: 9/28/2022 3:03 PM  Performed by: Giancarlo Birch MD  Authorized by: Giancarlo Birch MD   Comparison: compared with previous ECG   Similar to previous ECG  Comparison to previous ECG: Atrial fibrillation with controlled ventricular response 85/min nonspecific ST-T wave changes normal axis normal intervals no ectopy no significant change from 10/20/2021                Objective:       Physical Exam    /83 (BP Location: Right arm, Patient Position: Sitting, Cuff Size: Large Adult)   Pulse 59   Ht 177.8 cm (70\")   Wt 99.3 kg (219 lb)   SpO2 98%   BMI 31.42 kg/m²   The patient is alert, oriented and in no distress.    Vital signs as noted above.    Head and neck revealed no carotid bruits or jugular venous distension.  No thyromegaly or lymphadenopathy is present.    Lungs clear.  No wheezing.  Breath sounds are normal bilaterally.    Heart normal first and second heart sounds.  No murmur..  No pericardial rub is present.  No gallop is present.    Abdomen soft and nontender.  No organomegaly is present.    Extremities revealed good peripheral pulses without any pedal edema.    Skin warm and dry.    Musculoskeletal system is grossly normal.    CNS grossly normal.    Reviewed and updated.      "

## 2022-09-28 ENCOUNTER — OFFICE VISIT (OUTPATIENT)
Dept: CARDIOLOGY | Facility: CLINIC | Age: 52
End: 2022-09-28

## 2022-09-28 VITALS
WEIGHT: 219 LBS | DIASTOLIC BLOOD PRESSURE: 83 MMHG | SYSTOLIC BLOOD PRESSURE: 125 MMHG | OXYGEN SATURATION: 98 % | BODY MASS INDEX: 31.35 KG/M2 | HEIGHT: 70 IN | HEART RATE: 59 BPM

## 2022-09-28 DIAGNOSIS — I48.20 ATRIAL FIBRILLATION, CHRONIC: Primary | ICD-10-CM

## 2022-09-28 DIAGNOSIS — I34.0 NON-RHEUMATIC MITRAL REGURGITATION: ICD-10-CM

## 2022-09-28 DIAGNOSIS — R00.2 PALPITATIONS: ICD-10-CM

## 2022-09-28 DIAGNOSIS — I10 ESSENTIAL (PRIMARY) HYPERTENSION: ICD-10-CM

## 2022-09-28 DIAGNOSIS — I48.92 PAROXYSMAL ATRIAL FLUTTER: ICD-10-CM

## 2022-09-28 DIAGNOSIS — Z79.01 CHRONIC ANTICOAGULATION: ICD-10-CM

## 2022-09-28 PROCEDURE — 99214 OFFICE O/P EST MOD 30 MIN: CPT | Performed by: INTERNAL MEDICINE

## 2022-09-28 PROCEDURE — 93000 ELECTROCARDIOGRAM COMPLETE: CPT | Performed by: INTERNAL MEDICINE

## 2022-10-26 ENCOUNTER — LAB (OUTPATIENT)
Dept: FAMILY MEDICINE CLINIC | Facility: CLINIC | Age: 52
End: 2022-10-26

## 2022-10-26 ENCOUNTER — OFFICE VISIT (OUTPATIENT)
Dept: FAMILY MEDICINE CLINIC | Facility: CLINIC | Age: 52
End: 2022-10-26

## 2022-10-26 VITALS
WEIGHT: 220 LBS | HEIGHT: 70 IN | HEART RATE: 76 BPM | BODY MASS INDEX: 31.5 KG/M2 | TEMPERATURE: 98 F | SYSTOLIC BLOOD PRESSURE: 137 MMHG | DIASTOLIC BLOOD PRESSURE: 89 MMHG | OXYGEN SATURATION: 98 %

## 2022-10-26 DIAGNOSIS — R73.03 PREDIABETES: ICD-10-CM

## 2022-10-26 DIAGNOSIS — Z12.11 COLON CANCER SCREENING: ICD-10-CM

## 2022-10-26 DIAGNOSIS — I10 ESSENTIAL (PRIMARY) HYPERTENSION: ICD-10-CM

## 2022-10-26 DIAGNOSIS — I48.0 PAROXYSMAL ATRIAL FIBRILLATION: ICD-10-CM

## 2022-10-26 DIAGNOSIS — I63.9 CEREBROVASCULAR ACCIDENT (CVA), UNSPECIFIED MECHANISM: ICD-10-CM

## 2022-10-26 DIAGNOSIS — Z23 NEED FOR IMMUNIZATION AGAINST INFLUENZA: ICD-10-CM

## 2022-10-26 DIAGNOSIS — Z00.00 PREVENTATIVE HEALTH CARE: Primary | ICD-10-CM

## 2022-10-26 DIAGNOSIS — R47.89 GARBLED SPEECH: ICD-10-CM

## 2022-10-26 LAB
ALBUMIN SERPL-MCNC: 4.2 G/DL (ref 3.5–5.2)
ALBUMIN/GLOB SERPL: 1.3 G/DL
ALP SERPL-CCNC: 128 U/L (ref 39–117)
ALT SERPL W P-5'-P-CCNC: 26 U/L (ref 1–41)
ANION GAP SERPL CALCULATED.3IONS-SCNC: 12.8 MMOL/L (ref 5–15)
AST SERPL-CCNC: 35 U/L (ref 1–40)
BILIRUB SERPL-MCNC: 0.7 MG/DL (ref 0–1.2)
BUN SERPL-MCNC: 9 MG/DL (ref 6–20)
BUN/CREAT SERPL: 10.6 (ref 7–25)
CALCIUM SPEC-SCNC: 9.2 MG/DL (ref 8.6–10.5)
CHLORIDE SERPL-SCNC: 101 MMOL/L (ref 98–107)
CHOLEST SERPL-MCNC: 122 MG/DL (ref 0–200)
CO2 SERPL-SCNC: 25.2 MMOL/L (ref 22–29)
CREAT SERPL-MCNC: 0.85 MG/DL (ref 0.76–1.27)
EGFRCR SERPLBLD CKD-EPI 2021: 103.3 ML/MIN/1.73
GLOBULIN UR ELPH-MCNC: 3.3 GM/DL
GLUCOSE SERPL-MCNC: 79 MG/DL (ref 65–99)
HBA1C MFR BLD: 6 % (ref 3.5–5.6)
HDLC SERPL-MCNC: 45 MG/DL (ref 40–60)
LDLC SERPL CALC-MCNC: 63 MG/DL (ref 0–100)
LDLC/HDLC SERPL: 1.43 {RATIO}
POTASSIUM SERPL-SCNC: 4.9 MMOL/L (ref 3.5–5.2)
PROT SERPL-MCNC: 7.5 G/DL (ref 6–8.5)
SODIUM SERPL-SCNC: 139 MMOL/L (ref 136–145)
TRIGL SERPL-MCNC: 64 MG/DL (ref 0–150)
VLDLC SERPL-MCNC: 14 MG/DL (ref 5–40)

## 2022-10-26 PROCEDURE — 83036 HEMOGLOBIN GLYCOSYLATED A1C: CPT | Performed by: NURSE PRACTITIONER

## 2022-10-26 PROCEDURE — 99396 PREV VISIT EST AGE 40-64: CPT | Performed by: NURSE PRACTITIONER

## 2022-10-26 PROCEDURE — 90686 IIV4 VACC NO PRSV 0.5 ML IM: CPT | Performed by: NURSE PRACTITIONER

## 2022-10-26 PROCEDURE — G0008 ADMIN INFLUENZA VIRUS VAC: HCPCS | Performed by: NURSE PRACTITIONER

## 2022-10-26 PROCEDURE — 80053 COMPREHEN METABOLIC PANEL: CPT | Performed by: NURSE PRACTITIONER

## 2022-10-26 PROCEDURE — 80061 LIPID PANEL: CPT | Performed by: NURSE PRACTITIONER

## 2022-10-26 PROCEDURE — 36415 COLL VENOUS BLD VENIPUNCTURE: CPT

## 2022-10-26 NOTE — PATIENT INSTRUCTIONS
Work on diet and exercise  Follow up with neuro  Check labs  Complete cologuard  Referral to speech therapy

## 2022-11-17 ENCOUNTER — HOSPITAL ENCOUNTER (OUTPATIENT)
Dept: SPEECH THERAPY | Facility: HOSPITAL | Age: 52
Setting detail: THERAPIES SERIES
Discharge: HOME | End: 2022-11-17

## 2022-11-17 DIAGNOSIS — I63.9 CEREBROVASCULAR ACCIDENT (CVA), UNSPECIFIED MECHANISM: ICD-10-CM

## 2022-11-17 DIAGNOSIS — R47.89 GARBLED SPEECH: Primary | ICD-10-CM

## 2022-11-17 PROCEDURE — 92523 SPEECH SOUND LANG COMPREHEN: CPT

## 2022-11-17 NOTE — THERAPY EVALUATION
Outpatient Speech Language Pathology   Adult Speech Language Cognitive Initial Evaluation   Carlos     Patient Name: Claudy Pabon  : 1970  MRN: [id number]  Today's Date: 2022        Visit Date: 2022   Patient Active Problem List   Diagnosis   • Paroxysmal atrial fibrillation (HCC)   • Palpitations   • Essential (primary) hypertension   • Mitral regurgitation   • Paroxysmal atrial flutter (HCC)   • Persons encountering health services in other specified circumstances   • Arthralgia of hip   • At risk for osteoporosis   • Cellulitis of orbit   • Cerebrovascular accident (CVA) (HCC)   • Congenital spondylolisthesis   • Degeneration of intervertebral disc   • Dysphagia   • Encounter for other specified aftercare   • Screening for osteoporosis   • Fall   • Family history of diabetes mellitus   • Low back pain   • Thoracic or lumbosacral neuritis or radiculitis   • Obesity   • Open fracture of angle of jaw (HCC)   • Failed total hip arthroplasty (HCC)   • Osteoarthritis of hip   • Other specified complications of surgical and medical care, not elsewhere classified, initial encounter   • Other specified disorders of bone density and structure, multiple sites   • Overweight   • Pain in extremity   • Aftercare following joint replacement   • Wound infection   • Chronic anticoagulation   • Prediabetes        Past Medical History:   Diagnosis Date   • AF (paroxysmal atrial fibrillation) (HCC)    • Atrial flutter (HCC)    • Injury of back     spinal fusion   • Stroke ([facility])             Past Surgical History:   Procedure Laterality Date   • CARDIAC SURGERY      Ablation   • JOINT REPLACEMENT      Hip replacement   • REVISION OF TOTAL HIP FEMORAL Left    • SPINE SURGERY      Fusion   • VASECTOMY           Visit Dx:    ICD-10-CM ICD-9-CM   1. Garbled speech  R47.89 784.59   2. Cerebrovascular accident (CVA), unspecified mechanism (HCC)  I63.9 434.91                [facility] Evaluation -  "11/17/22 1700        Communication Assessment/Intervention    Document Type evaluation  -LF    Patient Observations alert;cooperative;agree to therapy  -LF    Patient Effort excellent  -LF    Symptoms Noted During/After Treatment none  -LF       General Information    Patient Profile Reviewed yes  -LF    Pertinent History Of Current Problem Mr. Pabon is a 53 y/o male who presents to [facility] for an OP [facility] eval d/t \"garbled speech.\" PMH significant for CVA in 2016. Pt seen by [facility] ST department for an OP VFSS on 11/23/2016 and 8/23/2018 w/ recommendation of regular and thin liquids. Moderate dysarthria noted informally and was judged to be 85% intelligible in conversation. Pt arrived to his appointment with his wife who provided majority of pt's hx. Mrs. Pabon reports pt's speech has progressively gotten worse over the last few years. While receiving OP ST at [facility] s/p CVA, staff reported his uvula was \"pulsing.\" Pt underwent a videostroboscopy at [facility] in July 2021 w/ findings of an inflamed L vocal cord w/ referral to [facility] neurology. Pt was seen by Dr. Swain and was placed on Keppra for a movement disorder. No improvement was seen so Keppra was d/c. Pt denies word finding difficulty or cognitive linguistic difficulties. He currently does not work and lives at home w/ his wife.       -LF    Precautions/Limitations, Vision vision impairment, bilaterally  -LF    Precautions/Limitations, Hearing WFL;for purposes of eval  -LF    Prior Level of Function-Communication WFL  -LF    Plans/Goals Discussed with patient;spouse/S.O.;agreed upon  -LF    Barriers to Rehab medically complex  -LF    Patient's Goals for Discharge functional communication  -LF    Family Goals for Discharge functional communication  -LF    Standardized Assessment Used Western Aphasia Battery  -LF       Pain    Additional Documentation Pain Scale: Numbers Pre/Post-Treatment (Group)  -LF       Pain Scale: Numbers Pre/Post-Treatment    Pretreatment Pain " Rating 0/10 - no pain  -LF    Posttreatment Pain Rating 0/10 - no pain  -LF       Reading Comprehension Assessment/Intervention    Reading Comprehension (Communication) unable/difficult to assess  -LF    Reading Comprehension, Comment Reading comprehension was unable to be assessed d/t vision deficits. Pt has glasses but does not use them.  -LF       Oral Motor Structure and Function    Oral Motor Structure and Function other (see comments)  -LF    Dentition Assessment natural, present and adequate  -LF    Mucosal Quality moist, healthy  -LF       Oral Musculature and Cranial Nerve Assessment    Oral Motor General Assessment velar impairment;other (see comments)  -LF    Oral Motor, Comment An oral motor exam was conducted which revealed slight tongue deviation to the right upon protrusion.. Adequate lingual lateralization and elevation, and adequate labial protrusion and retraction. Pt has natural and adequate dentition on top and bottom. Velum tremor noted at rest and during vocalization.  -LF       Standardized Tests    Formal Speech Language Tests WAB: Western Aphasia Battery  -LF       Spontaneous Speech    Information Content 10  -LF    Fluency 10  -LF    Spontaneous Speech Total 20  -LF       Comprehension    Yes/No Questions 60  -LF    Auditory Word Recongition 60  -LF    Sequential Commands 80  -LF    Comprehension Total 200  -LF       Repetition    Repetition Total 100  -LF       Naming    Object 60  -LF    Word Fluency 10  -LF    Sentence Completion 10  -LF    Responsive Speech 10  -LF    Naming Total 90  -LF       Aphasia    Aphasia Quotient 100  -LF    Aphasia Severity Pt presents w/ speech and language skills that are judged to be WFL. No moments of word finding difficulty noted at any time in conversation.   -LF             User Key  (r) = Recorded By, (t) = Taken By, (c) = Cosigned By    Initials Name Provider Type    LF Supriya Kelly, SLP Speech and Language Pathologist                       SLP  SLC Evaluation - 11/21/22 1100        Motor Speech Assessment/Intervention    Motor Speech Function moderate impairment;severe impairment  -LF    Characteristics Consistent with Dysarthria decreased articulation;hypernasality;decreased breath support  -LF    Automatic Speech (Communication) counting 1-20;moderate impairment  -LF    Conversational Speech (Communication) severe impairment  -LF    Motor Speech, Comment Pt presents w/ moderate to severe dysarthria characterized by decreased articulation, hypernasality, and decreased breath support. Cul de sac resonance observed intermittently in conversation. Pt judged to be 75% intelligible w/ automatic speech tasks and 50% intelligible in conversation to an unfamiliar listener.   -LF       Cursory Voice Assessment/Intervention    Quality and Resonance (Voice) further voice-assessment warranted;aphonia;breathy;hypernasal  -    Voice, Comment Pt's voice was assessed informally this date. Patient was able to sustain a vowel sound for ~18 seconds, which is within functional limits for adult males of his age range (12-24 seconds).  Breath support was observed to be reduced during conversational speech. Overall, pt w/ tremulous voice w/ high pitch and hypernasality, with intermittent moments of aphonia and cul de sac resonance . Recommend a repeat videostroboscopy for further voice dx tx.    -LF                                   SLP Evaluation Clinical Impressions    SLP Diagnosis communication disorder  -LF    SLP Diagnosis Comments Pt presents w/ verbal expression and language skills that are judged to be WFL. Pt presents w/ moderate to severe dysarthria in conversation w/ 50% intelligibility to an unfamiliar listener. Pt's voice was informally assessed and pt's voice was judged to be tremulous w/ high pitch and hypernasality, with intermittent moments of aphonia and cul de sac resonance . Recommend a repeat videostroboscopy for further voice dx tx. Thank you for  referring this pleasant pt.    -LF    Functional Impact functional impact in social situations;difficulty communicating wants, needs;difficulty communicating in an emergency;difficulty in expressing complex messages;restrictions in personal and social life  -LF              Recommendations    SLC Diagnostic Follow-Up Needed voice disorder  -LF            Time Calculation:                     Supriya Kelly, SLP  11/17/2022

## 2022-11-20 DIAGNOSIS — R47.89 GARBLED SPEECH: ICD-10-CM

## 2022-11-20 DIAGNOSIS — I63.9 CEREBROVASCULAR ACCIDENT (CVA), UNSPECIFIED MECHANISM: Primary | ICD-10-CM

## 2022-12-27 RX ORDER — APIXABAN 5 MG/1
TABLET, FILM COATED ORAL
Qty: 60 TABLET | Refills: 5 | Status: SHIPPED | OUTPATIENT
Start: 2022-12-27

## 2022-12-27 NOTE — TELEPHONE ENCOUNTER
Rx Refill Note  Requested Prescriptions     Pending Prescriptions Disp Refills   • Eliquis 5 MG tablet tablet [Pharmacy Med Name: Eliquis 5 mg tablet] 60 tablet 5     Sig: TAKE ONE TABLET BY MOUTH TWICE DAILY      Last office visit with prescribing clinician: 9/28/2022   Last telemedicine visit with prescribing clinician: 3/29/2023   Next office visit with prescribing clinician: 3/29/2023                         Would you like a call back once the refill request has been completed: [] Yes [] No    If the office needs to give you a call back, can they leave a voicemail: [] Yes [] No    Emmy Saul MA  12/27/22, 07:54 EST

## 2023-01-30 RX ORDER — ATORVASTATIN CALCIUM 40 MG/1
40 TABLET, FILM COATED ORAL
Qty: 90 TABLET | Refills: 1 | Status: SHIPPED | OUTPATIENT
Start: 2023-01-30

## 2023-01-30 NOTE — TELEPHONE ENCOUNTER
Rx Refill Note  Requested Prescriptions     Pending Prescriptions Disp Refills   • atorvastatin (LIPITOR) 40 MG tablet [Pharmacy Med Name: ATORVASTATIN CALCIUM 40 MG Tablet] 90 tablet 1     Sig: TAKE 1 TABLET BY MOUTH EVERY NIGHT AT BEDTIME.      Last office visit with prescribing clinician: 9/28/2022   Last telemedicine visit with prescribing clinician: 3/29/2023   Next office visit with prescribing clinician: 3/29/2023                         Would you like a call back once the refill request has been completed: [] Yes [] No    If the office needs to give you a call back, can they leave a voicemail: [] Yes [] No    Emmy Saul MA  01/30/23, 08:19 EST

## 2023-02-08 ENCOUNTER — HOSPITAL ENCOUNTER (OUTPATIENT)
Dept: GENERAL RADIOLOGY | Facility: HOSPITAL | Age: 53
Discharge: HOME | End: 2023-02-08
Admitting: NURSE PRACTITIONER
Payer: MEDICARE

## 2023-02-08 DIAGNOSIS — R47.89 GARBLED SPEECH: ICD-10-CM

## 2023-02-08 DIAGNOSIS — I63.9 CEREBROVASCULAR ACCIDENT (CVA), UNSPECIFIED MECHANISM: ICD-10-CM

## 2023-02-08 PROCEDURE — 74230 X-RAY XM SWLNG FUNCJ C+: CPT

## 2023-02-08 PROCEDURE — 92611 MOTION FLUOROSCOPY/SWALLOW: CPT

## 2023-02-08 RX ADMIN — BARIUM SULFATE 50 ML: 400 SUSPENSION ORAL at 09:53

## 2023-03-29 ENCOUNTER — OFFICE VISIT (OUTPATIENT)
Dept: CARDIOLOGY | Facility: CLINIC | Age: 53
End: 2023-03-29
Payer: MEDICARE

## 2023-03-29 VITALS
SYSTOLIC BLOOD PRESSURE: 127 MMHG | HEIGHT: 70 IN | DIASTOLIC BLOOD PRESSURE: 88 MMHG | OXYGEN SATURATION: 98 % | BODY MASS INDEX: 31.21 KG/M2 | WEIGHT: 218 LBS | HEART RATE: 88 BPM

## 2023-03-29 DIAGNOSIS — Z79.01 CHRONIC ANTICOAGULATION: Primary | ICD-10-CM

## 2023-03-29 DIAGNOSIS — R00.2 PALPITATIONS: ICD-10-CM

## 2023-03-29 DIAGNOSIS — I34.0 NON-RHEUMATIC MITRAL REGURGITATION: ICD-10-CM

## 2023-03-29 DIAGNOSIS — I10 ESSENTIAL (PRIMARY) HYPERTENSION: ICD-10-CM

## 2023-03-29 DIAGNOSIS — I48.92 PAROXYSMAL ATRIAL FLUTTER: ICD-10-CM

## 2023-03-29 DIAGNOSIS — I48.20 ATRIAL FIBRILLATION, CHRONIC: ICD-10-CM

## 2023-04-25 NOTE — PROGRESS NOTES
Encounter Date:04/26/2023  Last seen 3/29/2023      Patient ID: Claudy Pabon is a 53 y.o. male.    Chief Complaint:    History of atrial flutter  Anticoagulation management  Persistent atrial fibrillation  Hypertension  Mitral regurgitation     History of Present Illness  Since I have last seen, the patient has been without any chest discomfort ,shortness of breath, palpitations, dizziness or syncope.  Denies having any headache ,abdominal pain ,nausea, vomiting , diarrhea constipation, loss of weight or loss of appetite.  Denies having any excessive bruising ,hematuria or blood in the stool.    Review of all systems negative except as indicated.    Reviewed ROS.    Assessment and Plan         [[[[[[[[[[[    Impression  ============  -Persistent atrial fibrillation      - history of atrial flutter.  Patient had flutter ablation 2006 at [facility] by Dr. Gonzalez.    - Moderate tricuspid and moderate to significant mitral regurgitation.    Echocardiogram 4/26/2023 revealed  Moderate to significant mitral and moderate tricuspid regurgitation.  Calculated pulmonary artery pressure is 40 mmHg.  Left atrial enlargement.  Left ventricular size and contractility is normal with ejection fraction of 60%.    -anticoagulation - on Eliquis.     - history of stroke from which he partially recovered September 2016     -hypertension -well-controlled.     -History of movement disorder on Keppra     -history of back surgery and left hip surgery  ==================    Plan  ===============  Moderate to significant mitral and moderate tricuspid regurgitation.  Patient and patient's wife were explained about symptoms to watch for such as shortness of breath etc.    Echocardiogram 4/26/2023 revealed  Moderate to significant mitral and moderate tricuspid regurgitation.  Calculated pulmonary artery pressure is 40 mmHg.  Left atrial enlargement.  Left ventricular size and contractility is normal with ejection fraction of  60%.    Persistent atrial fibrillation  EKG 3/29/2023-atrial fibrillation     Anticoagulation status reviewed.  Patient is on Eliquis.  Observe for toxic effects.     Hypertension-well controlled  122/77  Continue diltiazem metoprolol     Dyslipidemia-continue atorvastatin     Movement disorder-on Keppra     Patient is not having any angina pectoris or congestive heart failure.     Medications were reviewed and updated.     Followup in the office  in 6 months with an echocardiogram to follow-up on mitral regurgitation..     Further plan will depend on patient's progress.  [[[[[[[[[[[[[[[[[[[[                         Diagnosis Plan   1. Chronic anticoagulation        2. Palpitations        3. Atrial fibrillation, chronic        4. Paroxysmal atrial flutter        5. Non-rheumatic mitral regurgitation        6. Essential (primary) hypertension        LAB RESULTS (LAST 7 DAYS)    CBC        BMP        CMP         BNP        TROPONIN        CoAg        Creatinine Clearance  CrCl cannot be calculated (Patient's most recent lab result is older than the maximum 30 days allowed.).    ABG        Radiology  No radiology results for the last day                The following portions of the patient's history were reviewed and updated as appropriate: allergies, current medications, past family history, past medical history, past social history, past surgical history and problem list.    Review of Systems   Constitutional: Negative for malaise/fatigue.   Cardiovascular: Negative for chest pain, leg swelling, palpitations and syncope.   Respiratory: Negative for shortness of breath.    Skin: Negative for rash.   Gastrointestinal: Negative for nausea and vomiting.   Neurological: Negative for dizziness, light-headedness and numbness.   All other systems reviewed and are negative.        Current Outpatient Medications:   •  atorvastatin (LIPITOR) 40 MG tablet, TAKE 1 TABLET BY MOUTH EVERY NIGHT AT BEDTIME., Disp: 90 tablet, Rfl:  "1  •  dilTIAZem (CARDIZEM) 60 MG tablet, TAKE 1 TABLET TWICE DAILY, Disp: 180 tablet, Rfl: 3  •  Eliquis 5 MG tablet tablet, TAKE ONE TABLET BY MOUTH TWICE DAILY, Disp: 60 tablet, Rfl: 5  •  metoprolol tartrate (LOPRESSOR) 50 MG tablet, TAKE 1 TABLET TWICE DAILY, Disp: 180 tablet, Rfl: 3    No Known Allergies    History reviewed. No pertinent family history.    Past Surgical History:   Procedure Laterality Date   • CARDIAC SURGERY  2009    Ablation   • JOINT REPLACEMENT  2011    Hip replacement   • REVISION OF TOTAL HIP FEMORAL Left    • SPINE SURGERY  2007    Fusion   • VASECTOMY  2005       Past Medical History:   Diagnosis Date   • AF (paroxysmal atrial fibrillation)    • Atrial flutter    • Injury of back     spinal fusion   • Stroke     2016       History reviewed. No pertinent family history.    Social History     Socioeconomic History   • Marital status:    Tobacco Use   • Smoking status: Never     Passive exposure: Past   • Smokeless tobacco: Former     Types: Chew   Vaping Use   • Vaping Use: Never used   Substance and Sexual Activity   • Alcohol use: Not Currently   • Drug use: Never   • Sexual activity: Yes     Partners: Female     Birth control/protection: Tubal ligation, Vasectomy         Procedures      Objective:       Physical Exam    /77 (BP Location: Right arm, Patient Position: Sitting, Cuff Size: Large Adult)   Pulse 80   Ht 177.8 cm (70\")   Wt 97.5 kg (215 lb)   SpO2 97% Comment: RA  BMI 30.85 kg/m²   The patient is alert, oriented and in no distress.    Vital signs as noted above.    Head and neck revealed no carotid bruits or jugular venous distension.  No thyromegaly or lymphadenopathy is present.    Lungs clear.  No wheezing.  Breath sounds are normal bilaterally.    Heart normal first and second heart sounds.  Grade 2 or 6 systolic murmur..  No pericardial rub is present.  No gallop is present.    Abdomen soft and nontender.  No organomegaly is present.    Extremities " revealed good peripheral pulses without any pedal edema.    Skin warm and dry.    Musculoskeletal system is grossly normal.    CNS grossly normal.    Reviewed and updated.         normal sinus rhythm

## 2023-04-26 ENCOUNTER — HOSPITAL ENCOUNTER (OUTPATIENT)
Dept: CARDIOLOGY | Facility: HOSPITAL | Age: 53
Discharge: HOME | End: 2023-04-26
Admitting: INTERNAL MEDICINE
Payer: MEDICARE

## 2023-04-26 ENCOUNTER — OFFICE VISIT (OUTPATIENT)
Dept: CARDIOLOGY | Facility: CLINIC | Age: 53
End: 2023-04-26
Payer: MEDICARE

## 2023-04-26 VITALS
OXYGEN SATURATION: 97 % | BODY MASS INDEX: 30.78 KG/M2 | SYSTOLIC BLOOD PRESSURE: 122 MMHG | HEART RATE: 80 BPM | DIASTOLIC BLOOD PRESSURE: 77 MMHG | HEIGHT: 70 IN | WEIGHT: 215 LBS

## 2023-04-26 DIAGNOSIS — I10 ESSENTIAL (PRIMARY) HYPERTENSION: ICD-10-CM

## 2023-04-26 DIAGNOSIS — I48.92 PAROXYSMAL ATRIAL FLUTTER: ICD-10-CM

## 2023-04-26 DIAGNOSIS — I34.0 NON-RHEUMATIC MITRAL REGURGITATION: ICD-10-CM

## 2023-04-26 DIAGNOSIS — I48.20 ATRIAL FIBRILLATION, CHRONIC: ICD-10-CM

## 2023-04-26 DIAGNOSIS — Z79.01 CHRONIC ANTICOAGULATION: ICD-10-CM

## 2023-04-26 DIAGNOSIS — Z79.01 CHRONIC ANTICOAGULATION: Primary | ICD-10-CM

## 2023-04-26 DIAGNOSIS — R00.2 PALPITATIONS: ICD-10-CM

## 2023-04-26 LAB
BH CV ECHO MEAS - ACS: 2.5 CM
BH CV ECHO MEAS - AO MAX PG: 2.5 MMHG
BH CV ECHO MEAS - AO MEAN PG: 1.37 MMHG
BH CV ECHO MEAS - AO ROOT DIAM: 3.8 CM
BH CV ECHO MEAS - AO V2 MAX: 79.4 CM/SEC
BH CV ECHO MEAS - AO V2 VTI: 16.5 CM
BH CV ECHO MEAS - AVA(I,D): 2.46 CM2
BH CV ECHO MEAS - EDV(CUBED): 85 ML
BH CV ECHO MEAS - EDV(MOD-SP4): 85.2 ML
BH CV ECHO MEAS - EF(MOD-BP): 63 %
BH CV ECHO MEAS - EF(MOD-SP4): 62.8 %
BH CV ECHO MEAS - ESV(CUBED): 30.8 ML
BH CV ECHO MEAS - ESV(MOD-SP4): 31.7 ML
BH CV ECHO MEAS - FS: 28.7 %
BH CV ECHO MEAS - IVS/LVPW: 0.75 CM
BH CV ECHO MEAS - IVSD: 0.87 CM
BH CV ECHO MEAS - LA DIMENSION: 5.3 CM
BH CV ECHO MEAS - LV DIASTOLIC VOL/BSA (35-75): 39.3 CM2
BH CV ECHO MEAS - LV MASS(C)D: 149.8 GRAMS
BH CV ECHO MEAS - LV MAX PG: 1.73 MMHG
BH CV ECHO MEAS - LV MEAN PG: 0.93 MMHG
BH CV ECHO MEAS - LV SYSTOLIC VOL/BSA (12-30): 14.6 CM2
BH CV ECHO MEAS - LV V1 MAX: 65.7 CM/SEC
BH CV ECHO MEAS - LV V1 VTI: 15 CM
BH CV ECHO MEAS - LVIDD: 4.4 CM
BH CV ECHO MEAS - LVIDS: 3.1 CM
BH CV ECHO MEAS - LVOT AREA: 2.7 CM2
BH CV ECHO MEAS - LVOT DIAM: 1.86 CM
BH CV ECHO MEAS - LVPWD: 1.15 CM
BH CV ECHO MEAS - MR MAX PG: 106.5 MMHG
BH CV ECHO MEAS - MR MAX VEL: 516 CM/SEC
BH CV ECHO MEAS - MV E MAX VEL: 92.3 CM/SEC
BH CV ECHO MEAS - MV MAX PG: 3.7 MMHG
BH CV ECHO MEAS - MV MEAN PG: 1.1 MMHG
BH CV ECHO MEAS - MV V2 VTI: 18.7 CM
BH CV ECHO MEAS - MVA(VTI): 2.18 CM2
BH CV ECHO MEAS - PA ACC TIME: 0.11 SEC
BH CV ECHO MEAS - PA PR(ACCEL): 30.7 MMHG
BH CV ECHO MEAS - PA V2 MAX: 74 CM/SEC
BH CV ECHO MEAS - RAP SYSTOLE: 8 MMHG
BH CV ECHO MEAS - RV MAX PG: 1.14 MMHG
BH CV ECHO MEAS - RV V1 MAX: 53.3 CM/SEC
BH CV ECHO MEAS - RV V1 VTI: 10.5 CM
BH CV ECHO MEAS - RVDD: 2.8 CM
BH CV ECHO MEAS - RVSP: 33 MMHG
BH CV ECHO MEAS - SI(MOD-SP4): 24.7 ML/M2
BH CV ECHO MEAS - SV(LVOT): 40.7 ML
BH CV ECHO MEAS - SV(MOD-SP4): 53.5 ML
BH CV ECHO MEAS - TR MAX PG: 25 MMHG
BH CV ECHO MEAS - TR MAX VEL: 249.8 CM/SEC
MAXIMAL PREDICTED HEART RATE: 165 BPM
STRESS TARGET HR: 140 BPM

## 2023-04-26 PROCEDURE — 93306 TTE W/DOPPLER COMPLETE: CPT | Performed by: INTERNAL MEDICINE

## 2023-04-26 PROCEDURE — 93306 TTE W/DOPPLER COMPLETE: CPT

## 2023-05-30 RX ORDER — APIXABAN 5 MG/1
TABLET, FILM COATED ORAL
Qty: 180 TABLET | Refills: 3 | Status: SHIPPED | OUTPATIENT
Start: 2023-05-30

## 2023-05-30 NOTE — TELEPHONE ENCOUNTER
Rx Refill Note  Requested Prescriptions     Pending Prescriptions Disp Refills   • Eliquis 5 MG tablet tablet [Pharmacy Med Name: ELIQUIS 5 MG Tablet] 180 tablet      Sig: TAKE 1 TABLET TWICE DAILY      Last office visit with prescribing clinician: 4/26/2023   Last telemedicine visit with prescribing clinician: Visit date not found   Next office visit with prescribing clinician: 11/1/2023                         Would you like a call back once the refill request has been completed: [] Yes [] No    If the office needs to give you a call back, can they leave a voicemail: [] Yes [] No    Emmy Saul MA  05/30/23, 07:50 EDT

## 2023-09-28 RX ORDER — APIXABAN 5 MG/1
TABLET, FILM COATED ORAL
Qty: 60 TABLET | Refills: 5 | Status: SHIPPED | OUTPATIENT
Start: 2023-09-28

## 2023-09-28 NOTE — TELEPHONE ENCOUNTER
Rx Refill Note  Requested Prescriptions     Pending Prescriptions Disp Refills    Eliquis 5 MG tablet tablet [Pharmacy Med Name: Eliquis 5 mg tablet] 60 tablet 5     Sig: TAKE ONE TABLET BY MOUTH TWICE DAILY      Last office visit with prescribing clinician: 4/26/2023   Last telemedicine visit with prescribing clinician: Visit date not found   Next office visit with prescribing clinician: 11/1/2023                         Would you like a call back once the refill request has been completed: [] Yes [] No    If the office needs to give you a call back, can they leave a voicemail: [] Yes [] No    Emmy Saul MA  09/28/23, 10:12 EDT

## 2023-12-07 DIAGNOSIS — I63.9 CEREBROVASCULAR ACCIDENT (CVA), UNSPECIFIED MECHANISM: Primary | ICD-10-CM

## 2024-01-02 ENCOUNTER — TELEPHONE (OUTPATIENT)
Dept: CARDIOLOGY | Facility: CLINIC | Age: 54
End: 2024-01-02
Payer: MEDICARE

## 2024-01-02 NOTE — TELEPHONE ENCOUNTER
Caller: Catalina Pabon    Relationship to patient: Emergency Contact    Best call back number: [phone]    Type of visit: FOLLOW UP     Requested date: ANY FRIDAY     If rescheduling, when is the original appointment: 23     Additional notes:PLEASE CALL PATIENT WIFE TO RESCHEDULE APPOINTMENT AS DUE TO PREVIOUS TIMEFRAME HAS  .

## 2024-01-02 NOTE — TELEPHONE ENCOUNTER
Message has been sent to Hanna at the diagnostic center to contact pt as pt needs an echo prior to his follow up with Dr Birch.

## 2024-01-21 DIAGNOSIS — R47.89 GARBLED SPEECH: Primary | ICD-10-CM

## 2024-01-21 DIAGNOSIS — I63.9 CEREBROVASCULAR ACCIDENT (CVA), UNSPECIFIED MECHANISM: ICD-10-CM

## 2024-02-19 NOTE — TELEPHONE ENCOUNTER
Rx Refill Note  Requested Prescriptions     Pending Prescriptions Disp Refills    apixaban (Eliquis) 5 MG tablet tablet 60 tablet 5     Sig: Take 1 tablet by mouth 2 (Two) Times a Day.      Last office visit with prescribing clinician: 4/26/2023   Last telemedicine visit with prescribing clinician: Visit date not found   Next office visit with prescribing clinician: Visit date not found                         Would you like a call back once the refill request has been completed: [] Yes [] No    If the office needs to give you a call back, can they leave a voicemail: [] Yes [] No    Emmy Saul MA  02/19/24, 09:19 EST

## 2024-03-04 ENCOUNTER — TELEPHONE (OUTPATIENT)
Dept: CARDIOLOGY | Facility: CLINIC | Age: 54
End: 2024-03-04
Payer: MEDICARE

## 2024-03-04 RX ORDER — METOPROLOL TARTRATE 50 MG/1
50 TABLET, FILM COATED ORAL 2 TIMES DAILY
Qty: 180 TABLET | Refills: 3 | Status: SHIPPED | OUTPATIENT
Start: 2024-03-04

## 2024-03-04 RX ORDER — ATORVASTATIN CALCIUM 40 MG/1
40 TABLET, FILM COATED ORAL
Qty: 90 TABLET | Refills: 3 | Status: SHIPPED | OUTPATIENT
Start: 2024-03-04

## 2024-03-04 RX ORDER — DILTIAZEM HYDROCHLORIDE 60 MG/1
60 TABLET, FILM COATED ORAL 2 TIMES DAILY
Qty: 180 TABLET | OUTPATIENT
Start: 2024-03-04

## 2024-03-04 RX ORDER — DILTIAZEM HYDROCHLORIDE 60 MG/1
60 TABLET, FILM COATED ORAL 2 TIMES DAILY
Qty: 60 TABLET | Refills: 0 | Status: SHIPPED | OUTPATIENT
Start: 2024-03-04

## 2024-03-04 RX ORDER — METOPROLOL TARTRATE 50 MG/1
50 TABLET, FILM COATED ORAL 2 TIMES DAILY
Qty: 60 TABLET | Refills: 0 | Status: SHIPPED | OUTPATIENT
Start: 2024-03-04 | End: 2024-03-04

## 2024-03-04 RX ORDER — ATORVASTATIN CALCIUM 40 MG/1
40 TABLET, FILM COATED ORAL
Qty: 30 TABLET | Refills: 0 | Status: SHIPPED | OUTPATIENT
Start: 2024-03-04 | End: 2024-03-04

## 2024-03-04 NOTE — TELEPHONE ENCOUNTER
Rx Refill Note  Requested Prescriptions     Signed Prescriptions Disp Refills    dilTIAZem (CARDIZEM) 60 MG tablet 60 tablet 0     Sig: Take 1 tablet by mouth 2 (Two) Times a Day.     Authorizing Provider: ALFONSO IYER     Ordering User: ANABEL GARZA    atorvastatin (LIPITOR) 40 MG tablet 30 tablet 0     Sig: Take 1 tablet by mouth every night at bedtime.     Authorizing Provider: ALFONSO IYER     Ordering User: ANABEL GARZA    metoprolol tartrate (LOPRESSOR) 50 MG tablet 60 tablet 0     Sig: Take 1 tablet by mouth 2 (Two) Times a Day.     Authorizing Provider: ALFONSO IYER     Ordering User: ANABEL GARZA      Last office visit with prescribing clinician: 4/26/2023   Last telemedicine visit with prescribing clinician: Visit date not found   Next office visit with prescribing clinician: Visit date not found                         Would you like a call back once the refill request has been completed: [] Yes [] No    If the office needs to give you a call back, can they leave a voicemail: [] Yes [] No    Anabel Garza MA  03/04/24, 08:43 EST

## 2024-03-04 NOTE — TELEPHONE ENCOUNTER
Incoming Refill Request      Medication requested (name and dose): DILATIAZEM 60 MG  METOPROLOL 50 MG  ATORVASTATIN 40 MG    Pharmacy where request should be sent: [facility]    Additional details provided by patient:     Best call back number: [phone]    Does the patient have less than a 3 day supply:  [x] Yes  [] No    Elissa Arcos Rep  03/04/24, 08:39 EST

## 2024-03-04 NOTE — TELEPHONE ENCOUNTER
Rx Refill Note  Requested Prescriptions     Pending Prescriptions Disp Refills    atorvastatin (LIPITOR) 40 MG tablet [Pharmacy Med Name: ATORVASTATIN 40MG TABLETS] 90 tablet      Sig: TAKE 1 TABLET BY MOUTH EVERY NIGHT AT BEDTIME    metoprolol tartrate (LOPRESSOR) 50 MG tablet [Pharmacy Med Name: METOPROLOL TARTRATE 50MG TABLETS] 180 tablet      Sig: TAKE 1 TABLET BY MOUTH TWICE DAILY      Last office visit with prescribing clinician: 4/26/2023   Last telemedicine visit with prescribing clinician: Visit date not found   Next office visit with prescribing clinician: Visit date not found                         Would you like a call back once the refill request has been completed: [] Yes [] No    If the office needs to give you a call back, can they leave a voicemail: [] Yes [] No    Emmy Saul MA  03/04/24, 10:15 EST

## 2024-03-04 NOTE — TELEPHONE ENCOUNTER
Rx Refill Note  Requested Prescriptions     Pending Prescriptions Disp Refills    dilTIAZem (CARDIZEM) 60 MG tablet [Pharmacy Med Name: DILTIAZEM 60MG TABLETS] 180 tablet      Sig: TAKE 1 TABLET BY MOUTH TWICE DAILY      Last office visit with prescribing clinician: 4/26/2023   Last telemedicine visit with prescribing clinician: Visit date not found   Next office visit with prescribing clinician: Visit date not found                         Would you like a call back once the refill request has been completed: [] Yes [] No    If the office needs to give you a call back, can they leave a voicemail: [] Yes [] No    Emmy Saul MA  03/04/24, 09:05 EST

## 2024-03-28 NOTE — TELEPHONE ENCOUNTER
Rx Refill Note  Requested Prescriptions     Signed Prescriptions Disp Refills    apixaban (Eliquis) 5 MG tablet tablet 60 tablet 0     Sig: Take 1 tablet by mouth 2 (Two) Times a Day.     Authorizing Provider: ALFONSO IYER     Ordering User: ANABEL GARZA      Last office visit with prescribing clinician: 4/26/2023   Last telemedicine visit with prescribing clinician: Visit date not found   Next office visit with prescribing clinician: Visit date not found                         Would you like a call back once the refill request has been completed: [] Yes [] No    If the office needs to give you a call back, can they leave a voicemail: [] Yes [] No    Anabel Garza MA  03/28/24, 16:09 EDT    Gave patient 1 mo supply - lov 4/2023

## 2024-04-05 RX ORDER — DILTIAZEM HYDROCHLORIDE 60 MG/1
60 TABLET, FILM COATED ORAL 2 TIMES DAILY
Qty: 28 TABLET | Refills: 0 | Status: SHIPPED | OUTPATIENT
Start: 2024-04-05

## 2024-04-05 NOTE — TELEPHONE ENCOUNTER
Calcium-Channel Blockers Protocol [id number]/05/2024 08:47 AM   Protocol Details Normal serum potassium in past 12 months    GFR> 30 ml/min in past 12 months    Recent or future visit with authorizing provider     PATIENT ALSO NEEDS ECHO WITH OV VISIT     LOV 4/2023    Rx Refill Note  Requested Prescriptions     Signed Prescriptions Disp Refills    dilTIAZem (CARDIZEM) 60 MG tablet 28 tablet 0     Sig: TAKE 1 TABLET BY MOUTH TWICE DAILY     Authorizing Provider: ALFONSO IYER     Ordering User: ANABEL GARZA      Last office visit with prescribing clinician: 4/26/2023   Last telemedicine visit with prescribing clinician: Visit date not found   Next office visit with prescribing clinician: Visit date not found                         Would you like a call back once the refill request has been completed: [] Yes [] No    If the office needs to give you a call back, can they leave a voicemail: [] Yes [] No    Anabel Garza MA  04/05/24, 09:07 EDT

## 2024-04-15 ENCOUNTER — TELEMEDICINE (OUTPATIENT)
Dept: FAMILY MEDICINE CLINIC | Facility: TELEHEALTH | Age: 54
End: 2024-04-15
Payer: MEDICARE

## 2024-04-15 DIAGNOSIS — J22 LOWER RESPIRATORY INFECTION (E.G., BRONCHITIS, PNEUMONIA, PNEUMONITIS, PULMONITIS): Primary | ICD-10-CM

## 2024-04-15 PROCEDURE — 99213 OFFICE O/P EST LOW 20 MIN: CPT | Performed by: NURSE PRACTITIONER

## 2024-04-15 RX ORDER — ALBUTEROL SULFATE 90 UG/1
2 AEROSOL, METERED RESPIRATORY (INHALATION) EVERY 4 HOURS PRN
Qty: 18 G | Refills: 0 | Status: SHIPPED | OUTPATIENT
Start: 2024-04-15

## 2024-04-15 RX ORDER — PREDNISONE 10 MG/1
TABLET ORAL
Qty: 21 TABLET | Refills: 0 | Status: SHIPPED | OUTPATIENT
Start: 2024-04-15

## 2024-04-15 RX ORDER — AZITHROMYCIN 250 MG/1
TABLET, FILM COATED ORAL
Qty: 6 TABLET | Refills: 0 | Status: SHIPPED | OUTPATIENT
Start: 2024-04-15

## 2024-04-15 RX ORDER — BENZONATATE 200 MG/1
200 CAPSULE ORAL 3 TIMES DAILY PRN
Qty: 21 CAPSULE | Refills: 0 | Status: SHIPPED | OUTPATIENT
Start: 2024-04-15

## 2024-04-15 NOTE — PROGRESS NOTES
You have chosen to receive care through a telehealth visit.  Do you consent to use a video/audio connection for your medical care today? Yes     CHIEF COMPLAINT  No chief complaint on file.        HPI  Claudy Pabon is a 54 y.o. male  presents with complaint of 1 week history of productive cough with yellow/green, wheezing, chest congestion, runny nose.  Denies fever, shortness of breath, head congestion.   Cough is bad after exertion and when lying down at night.     Review of Systems  See HPI    Past Medical History:   Diagnosis Date    AF (paroxysmal atrial fibrillation)     Atrial flutter     Injury of back     spinal fusion    Stroke     2016       No family history on file.    Social History     Socioeconomic History    Marital status:    Tobacco Use    Smoking status: Never     Passive exposure: Past    Smokeless tobacco: Former     Types: Chew   Vaping Use    Vaping status: Never Used   Substance and Sexual Activity    Alcohol use: Not Currently    Drug use: Never    Sexual activity: Yes     Partners: Female     Birth control/protection: Tubal ligation, Vasectomy       Claudy Pabon  reports that he has never smoked. He has been exposed to tobacco smoke. He has quit using smokeless tobacco.  His smokeless tobacco use included chew.               There were no vitals taken for this visit.    PHYSICAL EXAM  Physical Exam   Constitutional: He is oriented to person, place, and time. He appears well-developed and well-nourished. He does not have a sickly appearance. He does not appear ill.   HENT:   Head: Normocephalic and atraumatic.   Pulmonary/Chest: Effort normal.  No respiratory distress.  Neurological: He is alert and oriented to person, place, and time.           Diagnoses and all orders for this visit:    1. Lower respiratory infection (e.g., bronchitis, pneumonia, pneumonitis, pulmonitis) (Primary)  -     azithromycin (Zithromax Z-River) 250 MG tablet; Take 2 tablets by mouth on day 1, then 1 tablet  daily on days 2-5  Dispense: 6 tablet; Refill: 0  -     predniSONE (DELTASONE) 10 MG (21) dose pack; Use as directed on package  Dispense: 21 tablet; Refill: 0  -     albuterol sulfate  (90 Base) MCG/ACT inhaler; Inhale 2 puffs Every 4 (Four) Hours As Needed for Wheezing.  Dispense: 18 g; Refill: 0  -     benzonatate (TESSALON) 200 MG capsule; Take 1 capsule by mouth 3 (Three) Times a Day As Needed for Cough.  Dispense: 21 capsule; Refill: 0    --take medications as prescribed  --increase fluids, rest as needed, tylenol or ibuprofen for pain  --f/u in 5-7 days if no improvement        FOLLOW-UP  As discussed during visit with PCP/[facility] if no improvement or Urgent Care/Emergency Department if worsening of symptoms    Patient verbalizes understanding of medication dosage, comfort measures, instructions for treatment and follow-up.    Celia Blanton, ITALO  04/15/2024  17:40 EDT    The use of a video visit has been reviewed with the patient and verbal informed consent has been obtained. Myself and Claudy Pabon participated in this visit. The patient is located in [address] [place] [address] [place] [facility].    I am located in [place], [place]. [employer] and [employer] were utilized. I spent 8 minutes in the patient's chart for this visit.      Note Disclaimer: At [facility], we believe that sharing information builds trust and better   relationships. You are receiving this note because you recently visited [facility]. It is possible you   will see health information before a provider has talked with you about it. This kind of information can   be easy to misunderstand. To help you fully understand what it means for your health, we urge you to   discuss this note with your provider.

## 2024-04-18 RX ORDER — DILTIAZEM HYDROCHLORIDE 60 MG/1
60 TABLET, FILM COATED ORAL 2 TIMES DAILY
Qty: 28 TABLET | Refills: 0 | OUTPATIENT
Start: 2024-04-18

## 2024-04-18 NOTE — TELEPHONE ENCOUNTER
Calcium-Channel Blockers Protocol [id number]/18/2024 06:51 AM   Protocol Details Normal serum potassium in past 12 months    GFR> 30 ml/min in past 12 months    Recent or future visit with authorizing provider       Rx Refill Note  Requested Prescriptions     Refused Prescriptions Disp Refills    dilTIAZem (CARDIZEM) 60 MG tablet [Pharmacy Med Name: DILTIAZEM 60MG TABLETS] 28 tablet 0     Sig: TAKE 1 TABLET BY MOUTH TWICE DAILY     Refused By: ANABEL GARZA     Reason for Refusal: Patient needs an appointment      Last office visit with prescribing clinician: 4/26/2023   Last telemedicine visit with prescribing clinician: Visit date not found   Next office visit with prescribing clinician: Visit date not found                         Would you like a call back once the refill request has been completed: [] Yes [] No    If the office needs to give you a call back, can they leave a voicemail: [] Yes [] No    Anabel Garza MA  04/18/24, 08:40 EDT

## 2024-04-19 ENCOUNTER — TELEPHONE (OUTPATIENT)
Dept: CARDIOLOGY | Facility: CLINIC | Age: 54
End: 2024-04-19
Payer: MEDICARE

## 2024-04-19 RX ORDER — DILTIAZEM HYDROCHLORIDE 60 MG/1
60 TABLET, FILM COATED ORAL 2 TIMES DAILY
Qty: 180 TABLET | OUTPATIENT
Start: 2024-04-19

## 2024-04-19 RX ORDER — DILTIAZEM HYDROCHLORIDE 60 MG/1
60 TABLET, FILM COATED ORAL 2 TIMES DAILY
Qty: 60 TABLET | Refills: 0 | Status: SHIPPED | OUTPATIENT
Start: 2024-04-19

## 2024-04-19 NOTE — TELEPHONE ENCOUNTER
Rx Refill Note  Requested Prescriptions     Signed Prescriptions Disp Refills    dilTIAZem (CARDIZEM) 60 MG tablet 60 tablet 0     Sig: Take 1 tablet by mouth 2 (Two) Times a Day.     Authorizing Provider: ALFONSO IYER     Ordering User: ANABEL GARZA      Last office visit with prescribing clinician: 4/26/2023   Last telemedicine visit with prescribing clinician: Visit date not found   Next office visit with prescribing clinician: 4/29/2024                         Would you like a call back once the refill request has been completed: [] Yes [] No    If the office needs to give you a call back, can they leave a voicemail: [] Yes [] No    Anabel Garza MA  04/19/24, 10:44 EDT

## 2024-04-19 NOTE — TELEPHONE ENCOUNTER
Caller: Catalina Pabon    Relationship: Emergency Contact    Best call back number: [phone]     Which medication are you concerned about: PT AND WIFE FOLLOWING BACK UP TO SCHEDULE APPT FOR MEDICATION REFILLS - THEY ARE ASKING IF POSSIBLE TO SEND REFILL TO PHARMACY TO COVER UNTIL APPT - THEY ARE SCHEDULED 04.29.24 @ 5139 WITH DR IYER

## 2024-04-19 NOTE — TELEPHONE ENCOUNTER
Rx Refill Note  Requested Prescriptions     Refused Prescriptions Disp Refills    dilTIAZem (CARDIZEM) 60 MG tablet [Pharmacy Med Name: DILTIAZEM 60MG TABLETS] 180 tablet      Sig: TAKE 1 TABLET BY MOUTH TWICE DAILY     Refused By: ANABEL GARZA     Reason for Refusal: Other      Last office visit with prescribing clinician: 4/26/2023   Last telemedicine visit with prescribing clinician: Visit date not found   Next office visit with prescribing clinician: 4/29/2024                         Would you like a call back once the refill request has been completed: [] Yes [] No    If the office needs to give you a call back, can they leave a voicemail: [] Yes [] No    Anabel Garza MA  04/19/24, 10:56 EDT

## 2024-04-21 NOTE — PROGRESS NOTES
Encounter Date:04/29/2024    Last seen-4/26/2023      Patient ID: Claudy Pabon is a 54 y.o. male.    Chief Complaint:     History of atrial flutter  Anticoagulation management  Persistent atrial fibrillation  Hypertension  Mitral regurgitation     History of Present Illness  Since I have last seen, the patient has been without any chest discomfort ,shortness of breath, palpitations, dizziness or syncope.  Denies having any headache ,abdominal pain ,nausea, vomiting , diarrhea constipation, loss of weight or loss of appetite.  Denies having any excessive bruising ,hematuria or blood in the stool.    Review of all systems negative except as indicated.    Reviewed ROS.  Assessment and Plan         [[[[[[[[[[[  History  ============  -Persistent atrial fibrillation      - history of atrial flutter.  Patient had flutter ablation 2006 at [facility] by Dr. Gonzalez.     - Moderate tricuspid and moderate to significant mitral regurgitation.     Echocardiogram 4/26/2023 revealed  Moderate to significant mitral and moderate tricuspid regurgitation.  Calculated pulmonary artery pressure is 40 mmHg.  Left atrial enlargement.  Left ventricular size and contractility is normal with ejection fraction of 60%.     -anticoagulation - on Eliquis.     - history of stroke from which he partially recovered September 2016     -hypertension -well-controlled.     -History of movement disorder on Keppra     -history of back surgery and left hip surgery  ==================    Plan  ===============  Moderate to significant mitral and moderate tricuspid regurgitation.  Patient and patient's wife were explained about symptoms to watch for such as shortness of breath etc.     Echocardiogram 4/26/2023 revealed  Moderate to significant mitral and moderate tricuspid regurgitation.  Calculated pulmonary artery pressure is 40 mmHg.  Left atrial enlargement.  Left ventricular size and contractility is normal with ejection fraction of 60%.      Persistent atrial fibrillation  EKG 3/29/2023-atrial fibrillation  EKG 4/29/2024-atrial fibrillation     Anticoagulation status reviewed.  Patient is on Eliquis.  Observe for toxic effects.     Hypertension-well controlled  109/75  Continue diltiazem metoprolol     Dyslipidemia-continue atorvastatin     Movement disorder-on Keppra     Patient is not having any angina pectoris or congestive heart failure.     Medications were reviewed and updated.     Followup in the office  in 6 months with an echocardiogram to follow-up on mitral regurgitation..     Further plan will depend on patient's progress.    Reviewed and updated-4/29/2024.  [[[[[[[[[[[[[[[[[[[[                 Diagnosis Plan   1. Paroxysmal atrial fibrillation        2. Nonrheumatic mitral valve regurgitation        3. Paroxysmal atrial flutter        4. Essential (primary) hypertension        LAB RESULTS (LAST 7 DAYS)    CBC        BMP        CMP         BNP        TROPONIN        CoAg        Creatinine Clearance  CrCl cannot be calculated (Patient's most recent lab result is older than the maximum 30 days allowed.).    ABG        Radiology  No radiology results for the last day                The following portions of the patient's history were reviewed and updated as appropriate: allergies, current medications, past family history, past medical history, past social history, past surgical history, and problem list.    Review of Systems   Constitutional: Negative for malaise/fatigue.   Cardiovascular:  Negative for chest pain, dyspnea on exertion, leg swelling and palpitations.   Respiratory:  Negative for cough and shortness of breath.    Gastrointestinal:  Negative for abdominal pain, nausea and vomiting.   Neurological:  Negative for dizziness, focal weakness, headaches, light-headedness and numbness.   All other systems reviewed and are negative.      Current Outpatient Medications:     albuterol sulfate  (90 Base) MCG/ACT inhaler, Inhale 2  puffs Every 4 (Four) Hours As Needed for Wheezing., Disp: 18 g, Rfl: 0    apixaban (Eliquis) 5 MG tablet tablet, Take 1 tablet by mouth 2 (Two) Times a Day., Disp: 60 tablet, Rfl: 0    atorvastatin (LIPITOR) 40 MG tablet, TAKE 1 TABLET BY MOUTH EVERY NIGHT AT BEDTIME, Disp: 90 tablet, Rfl: 3    azithromycin (Zithromax Z-River) 250 MG tablet, Take 2 tablets by mouth on day 1, then 1 tablet daily on days 2-5, Disp: 6 tablet, Rfl: 0    benzonatate (TESSALON) 200 MG capsule, Take 1 capsule by mouth 3 (Three) Times a Day As Needed for Cough., Disp: 21 capsule, Rfl: 0    dilTIAZem (CARDIZEM) 60 MG tablet, Take 1 tablet by mouth 2 (Two) Times a Day., Disp: 60 tablet, Rfl: 0    metoprolol tartrate (LOPRESSOR) 50 MG tablet, TAKE 1 TABLET BY MOUTH TWICE DAILY, Disp: 180 tablet, Rfl: 3    predniSONE (DELTASONE) 10 MG (21) dose pack, Use as directed on package, Disp: 21 tablet, Rfl: 0    No Known Allergies    No family history on file.    Past Surgical History:   Procedure Laterality Date    CARDIAC SURGERY  2009    Ablation    JOINT REPLACEMENT  2011    Hip replacement    REVISION OF TOTAL HIP FEMORAL Left     SPINE SURGERY  2007    Fusion    VASECTOMY  2005       Past Medical History:   Diagnosis Date    AF (paroxysmal atrial fibrillation)     Atrial flutter     Injury of back     spinal fusion    Stroke     2016       No family history on file.    Social History     Socioeconomic History    Marital status:    Tobacco Use    Smoking status: Never     Passive exposure: Past    Smokeless tobacco: Former     Types: Chew   Vaping Use    Vaping status: Never Used   Substance and Sexual Activity    Alcohol use: Not Currently    Drug use: Never    Sexual activity: Yes     Partners: Female     Birth control/protection: Tubal ligation, Vasectomy           ECG 12 Lead    Date/Time: 4/29/2024 4:26 PM  Performed by: Giancarlo Birch MD    Authorized by: Giancarlo Birch MD  Comparison: compared with previous ECG   Similar to  previous ECG  Comparison to previous ECG: Atrial fibrillation with controlled ventricular response 77/min nonspecific ST-T wave changes normal axis normal intervals no ectopy no significant change from previous EKG.        Objective:       Physical Exam    There were no vitals taken for this visit.  The patient is alert, oriented and in no distress.    Vital signs as noted above.    Head and neck revealed no carotid bruits or jugular venous distension.  No thyromegaly or lymphadenopathy is present.    Lungs clear.  No wheezing.  Breath sounds are normal bilaterally.    Heart normal first and second heart sounds.  Grade 2 or 6 systolic murmur..  No pericardial rub is present.  No gallop is present.    Abdomen soft and nontender.  No organomegaly is present.    Extremities revealed good peripheral pulses without any pedal edema.    Skin warm and dry.    Musculoskeletal system is grossly normal.    CNS-movement disorder.    Reviewed and updated

## 2024-04-29 ENCOUNTER — OFFICE VISIT (OUTPATIENT)
Dept: CARDIOLOGY | Facility: CLINIC | Age: 54
End: 2024-04-29
Payer: MEDICARE

## 2024-04-29 VITALS
SYSTOLIC BLOOD PRESSURE: 109 MMHG | HEART RATE: 77 BPM | BODY MASS INDEX: 30.21 KG/M2 | HEIGHT: 70 IN | DIASTOLIC BLOOD PRESSURE: 75 MMHG | WEIGHT: 211 LBS | OXYGEN SATURATION: 97 %

## 2024-04-29 DIAGNOSIS — I48.0 PAROXYSMAL ATRIAL FIBRILLATION: Primary | ICD-10-CM

## 2024-04-29 DIAGNOSIS — I10 ESSENTIAL (PRIMARY) HYPERTENSION: ICD-10-CM

## 2024-04-29 DIAGNOSIS — I34.0 NONRHEUMATIC MITRAL VALVE REGURGITATION: ICD-10-CM

## 2024-04-29 DIAGNOSIS — I48.92 PAROXYSMAL ATRIAL FLUTTER: ICD-10-CM

## 2024-04-29 PROCEDURE — 1160F RVW MEDS BY RX/DR IN RCRD: CPT | Performed by: INTERNAL MEDICINE

## 2024-04-29 PROCEDURE — 3074F SYST BP LT 130 MM HG: CPT | Performed by: INTERNAL MEDICINE

## 2024-04-29 PROCEDURE — 93000 ELECTROCARDIOGRAM COMPLETE: CPT | Performed by: INTERNAL MEDICINE

## 2024-04-29 PROCEDURE — 3078F DIAST BP <80 MM HG: CPT | Performed by: INTERNAL MEDICINE

## 2024-04-29 PROCEDURE — 99214 OFFICE O/P EST MOD 30 MIN: CPT | Performed by: INTERNAL MEDICINE

## 2024-04-29 PROCEDURE — 1159F MED LIST DOCD IN RCRD: CPT | Performed by: INTERNAL MEDICINE

## 2024-05-02 RX ORDER — APIXABAN 5 MG/1
5 TABLET, FILM COATED ORAL 2 TIMES DAILY
Qty: 180 TABLET | Refills: 3 | Status: SHIPPED | OUTPATIENT
Start: 2024-05-02

## 2024-05-02 NOTE — TELEPHONE ENCOUNTER
Rx Refill Note  Requested Prescriptions     Pending Prescriptions Disp Refills    Eliquis 5 MG tablet tablet [Pharmacy Med Name: ELIQUIS 5MG TABLETS] 180 tablet 3     Sig: TAKE 1 TABLET BY MOUTH TWICE DAILY      Last office visit with prescribing clinician: 4/29/2024   Last telemedicine visit with prescribing clinician: Visit date not found   Next office visit with prescribing clinician: 10/30/2024                             Peggy Gavin MA  05/02/24, 09:09 EDT

## 2024-05-17 RX ORDER — DILTIAZEM HYDROCHLORIDE 60 MG/1
60 TABLET, FILM COATED ORAL 2 TIMES DAILY
Qty: 180 TABLET | Refills: 3 | Status: SHIPPED | OUTPATIENT
Start: 2024-05-17

## 2024-05-17 NOTE — TELEPHONE ENCOUNTER
Rx Refill Note  Requested Prescriptions     Pending Prescriptions Disp Refills    dilTIAZem (CARDIZEM) 60 MG tablet [Pharmacy Med Name: DILTIAZEM 60MG TABLETS] 180 tablet 3     Sig: TAKE 1 TABLET BY MOUTH TWICE DAILY      Last office visit with prescribing clinician: 4/29/2024   Last telemedicine visit with prescribing clinician: Visit date not found   Next office visit with prescribing clinician: 10/30/2024                         Would you like a call back once the refill request has been completed: [] Yes [] No    If the office needs to give you a call back, can they leave a voicemail: [] Yes [] No    Emmy Saul MA  05/17/24, 08:31 EDT

## 2024-06-25 ENCOUNTER — LAB (OUTPATIENT)
Dept: FAMILY MEDICINE CLINIC | Facility: CLINIC | Age: 54
End: 2024-06-25
Payer: MEDICARE

## 2024-06-25 ENCOUNTER — OFFICE VISIT (OUTPATIENT)
Dept: FAMILY MEDICINE CLINIC | Facility: CLINIC | Age: 54
End: 2024-06-25
Payer: MEDICARE

## 2024-06-25 VITALS
WEIGHT: 215 LBS | OXYGEN SATURATION: 97 % | TEMPERATURE: 98 F | HEART RATE: 91 BPM | SYSTOLIC BLOOD PRESSURE: 137 MMHG | HEIGHT: 70 IN | BODY MASS INDEX: 30.78 KG/M2 | DIASTOLIC BLOOD PRESSURE: 94 MMHG

## 2024-06-25 DIAGNOSIS — Z12.5 PROSTATE CANCER SCREENING: ICD-10-CM

## 2024-06-25 DIAGNOSIS — R73.03 PREDIABETES: ICD-10-CM

## 2024-06-25 DIAGNOSIS — Z00.00 MEDICARE ANNUAL WELLNESS VISIT, SUBSEQUENT: ICD-10-CM

## 2024-06-25 DIAGNOSIS — I10 ESSENTIAL (PRIMARY) HYPERTENSION: ICD-10-CM

## 2024-06-25 DIAGNOSIS — M25.562 ACUTE PAIN OF LEFT KNEE: ICD-10-CM

## 2024-06-25 DIAGNOSIS — Z00.00 MEDICARE ANNUAL WELLNESS VISIT, SUBSEQUENT: Primary | ICD-10-CM

## 2024-06-25 DIAGNOSIS — I48.0 PAROXYSMAL ATRIAL FIBRILLATION: ICD-10-CM

## 2024-06-25 DIAGNOSIS — Z12.11 COLON CANCER SCREENING: ICD-10-CM

## 2024-06-25 DIAGNOSIS — I63.9 CEREBROVASCULAR ACCIDENT (CVA), UNSPECIFIED MECHANISM: ICD-10-CM

## 2024-06-25 LAB
ALBUMIN SERPL-MCNC: 4.3 G/DL (ref 3.5–5.2)
ALBUMIN/GLOB SERPL: 1.5 G/DL
ALP SERPL-CCNC: 128 U/L (ref 39–117)
ALT SERPL W P-5'-P-CCNC: 20 U/L (ref 1–41)
ANION GAP SERPL CALCULATED.3IONS-SCNC: 9.4 MMOL/L (ref 5–15)
AST SERPL-CCNC: 24 U/L (ref 1–40)
BILIRUB SERPL-MCNC: 0.7 MG/DL (ref 0–1.2)
BUN SERPL-MCNC: 10 MG/DL (ref 6–20)
BUN/CREAT SERPL: 10.3 (ref 7–25)
CALCIUM SPEC-SCNC: 9.7 MG/DL (ref 8.6–10.5)
CHLORIDE SERPL-SCNC: 104 MMOL/L (ref 98–107)
CHOLEST SERPL-MCNC: 103 MG/DL (ref 0–200)
CO2 SERPL-SCNC: 27.6 MMOL/L (ref 22–29)
CREAT SERPL-MCNC: 0.97 MG/DL (ref 0.76–1.27)
EGFRCR SERPLBLD CKD-EPI 2021: 91.6 ML/MIN/1.73
GLOBULIN UR ELPH-MCNC: 2.9 GM/DL
GLUCOSE SERPL-MCNC: 91 MG/DL (ref 65–99)
HBA1C MFR BLD: 6.2 % (ref 4.8–5.6)
HDLC SERPL-MCNC: 43 MG/DL (ref 40–60)
LDLC SERPL CALC-MCNC: 48 MG/DL (ref 0–100)
LDLC/HDLC SERPL: 1.18 {RATIO}
POTASSIUM SERPL-SCNC: 4.8 MMOL/L (ref 3.5–5.2)
PROT SERPL-MCNC: 7.2 G/DL (ref 6–8.5)
PSA SERPL-MCNC: 0.68 NG/ML (ref 0–4)
SODIUM SERPL-SCNC: 141 MMOL/L (ref 136–145)
TRIGL SERPL-MCNC: 47 MG/DL (ref 0–150)
VLDLC SERPL-MCNC: 12 MG/DL (ref 5–40)

## 2024-06-25 PROCEDURE — 3075F SYST BP GE 130 - 139MM HG: CPT | Performed by: NURSE PRACTITIONER

## 2024-06-25 PROCEDURE — 36415 COLL VENOUS BLD VENIPUNCTURE: CPT

## 2024-06-25 PROCEDURE — 1159F MED LIST DOCD IN RCRD: CPT | Performed by: NURSE PRACTITIONER

## 2024-06-25 PROCEDURE — 83036 HEMOGLOBIN GLYCOSYLATED A1C: CPT | Performed by: NURSE PRACTITIONER

## 2024-06-25 PROCEDURE — G0103 PSA SCREENING: HCPCS | Performed by: NURSE PRACTITIONER

## 2024-06-25 PROCEDURE — 80061 LIPID PANEL: CPT | Performed by: NURSE PRACTITIONER

## 2024-06-25 PROCEDURE — 1160F RVW MEDS BY RX/DR IN RCRD: CPT | Performed by: NURSE PRACTITIONER

## 2024-06-25 PROCEDURE — 80053 COMPREHEN METABOLIC PANEL: CPT | Performed by: NURSE PRACTITIONER

## 2024-06-25 PROCEDURE — G0439 PPPS, SUBSEQ VISIT: HCPCS | Performed by: NURSE PRACTITIONER

## 2024-06-25 PROCEDURE — 1170F FXNL STATUS ASSESSED: CPT | Performed by: NURSE PRACTITIONER

## 2024-06-25 PROCEDURE — 3080F DIAST BP >= 90 MM HG: CPT | Performed by: NURSE PRACTITIONER

## 2024-06-25 PROCEDURE — 99213 OFFICE O/P EST LOW 20 MIN: CPT | Performed by: NURSE PRACTITIONER

## 2024-06-25 NOTE — PROGRESS NOTES
The ABCs of the Annual Wellness Visit  Subsequent Medicare Wellness Visit    Chief Complaint   Patient presents with    Medicare Wellness-subsequent      Subjective    History of Present Illness:  Claudy Pabon is a 54 y.o. male who presents for a Subsequent Medicare Wellness Visit.  He has a history of stroke in 2016  Has seen ST due to swallowing difficulty and garbled speech.   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   He is  with children  He goes to PT 2 times weekly.  Was previously in OT and ST.  Eating a well balanced diet  Does not smoke or drink.   Pt has had some left knee pain.   Pain is daily  Had a history of hip replacement and revision  Will follow up with ortho     HTN- currently on metoprolol 50mg bid and Cardizem 60 mg twice a day. Doing well on medication. Denies CP, SOA, dizziness, HA     A-fib- on eliquis 5 mg twice daily.  He is also on metoprolol and Cardizem.  Pt is current with cardiology Dr. Birch.  He has had a previous ablation for a flutter in the past.       Stroke- had a stroke in 2016.  Currently taking eliquis 5mg. Doing well but with some right sided residual.  He is now seeing neurology at [facility] Dr. Carpenter.  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 He was only taking Keppra for 3 months. He is not taking any medications to treat the movement disorder at this moment.      Anxiety-patient was previously on Lexapro but he is not currently on any medication at this time. His spouse states she has seen more obsessive behaviors/fixations since he has stopped the Lexapro 5 months ago. He started taking the Lexapro following his stroke in 2016. Pt denies depression. Doesn't want to be on meds.      Hyperlipidemia- currently taking lipitor 40mg daily.  Tries to eat a well balanced diet.      Labs- due  Colonoscopy- wants  cologuard      Vaccines:  Flu- will get today   Tdap- due- medicare wont cover  Shingles- will call insurance   PNA- UTD     Dental exam- not UTD   Eye exam- UTD        The following portions of the patient's history were reviewed and   updated as appropriate: allergies, current medications, past family history, past medical history, past social history, past surgical history, and problem list.    Compared to one year ago, the patient feels his physical   health is the same.    Compared to one year ago, the patient feels his mental   health is the same.    Recent Hospitalizations:  He was not admitted to the hospital during the last year.       Current Medical Providers:  Patient Care Team:  Eugenia John APRN as PCP - General (Nurse Practitioner)  Giancarlo Birch MD as Consulting Physician (Cardiology)    Outpatient Medications Prior to Visit   Medication Sig Dispense Refill    apixaban (Eliquis) 5 MG tablet tablet TAKE 1 TABLET BY MOUTH TWICE DAILY 180 tablet 3    atorvastatin (LIPITOR) 40 MG tablet TAKE 1 TABLET BY MOUTH EVERY NIGHT AT BEDTIME 90 tablet 3    dilTIAZem (CARDIZEM) 60 MG tablet TAKE 1 TABLET BY MOUTH TWICE DAILY 180 tablet 3    metoprolol tartrate (LOPRESSOR) 50 MG tablet TAKE 1 TABLET BY MOUTH TWICE DAILY 180 tablet 3    albuterol sulfate  (90 Base) MCG/ACT inhaler Inhale 2 puffs Every 4 (Four) Hours As Needed for Wheezing. 18 g 0    benzonatate (TESSALON) 200 MG capsule Take 1 capsule by mouth 3 (Three) Times a Day As Needed for Cough. 21 capsule 0     No facility-administered medications prior to visit.       No opioid medication identified on active medication list. I have reviewed chart for other potential  high risk medication/s and harmful drug interactions in the elderly.        Aspirin is not on active medication list.  Aspirin use is not indicated based on review of current medical condition/s. Risk of harm outweighs potential benefits.  .    Patient Active Problem List    Diagnosis    Paroxysmal atrial fibrillation    Palpitations    Essential (primary) hypertension    Mitral regurgitation    Paroxysmal atrial flutter    Persons encountering health services in other specified circumstances    Arthralgia of hip    At risk for osteoporosis    Cellulitis of orbit    Cerebrovascular accident (CVA)    Congenital spondylolisthesis    Degeneration of intervertebral disc    Dysphagia    Encounter for other specified aftercare    Screening for osteoporosis    Fall    Family history of diabetes mellitus    Low back pain    Thoracic or lumbosacral neuritis or radiculitis    Obesity    Open fracture of angle of jaw    Failed total hip arthroplasty    Osteoarthritis of hip    Other specified complications of surgical and medical care, not elsewhere classified, initial encounter    Other specified disorders of bone density and structure, multiple sites    Overweight    Pain in extremity    Aftercare following joint replacement    Wound infection    Chronic anticoagulation    Prediabetes     Advance Care Planning  Advance Directive is not on file.  ACP discussion was held with the patient during this visit. Patient does not have an advance directive, declines further assistance.    Review of Systems   Constitutional:  Negative for chills, fatigue and fever.   Respiratory:  Negative for chest tightness and shortness of breath.    Cardiovascular:  Negative for chest pain and palpitations.   Gastrointestinal:  Negative for abdominal pain, constipation, diarrhea, nausea and vomiting.   Genitourinary:  Negative for frequency and urgency.   Musculoskeletal:  Positive for arthralgias.   Skin:         Some discoloration (blotching) in bilateral ankles   Neurological:  Negative for dizziness.   Psychiatric/Behavioral:  Negative for self-injury and suicidal ideas. The patient is not nervous/anxious.         Objective    Vitals:    06/25/24 1046   BP: 137/94   BP Location: Left arm   Patient Position:  "Sitting   Cuff Size: Large Adult   Pulse: 91   Temp: 98 °F (36.7 °C)   TempSrc: Tympanic   SpO2: 97%   Weight: 97.5 kg (215 lb)   Height: 177.8 cm (70\")     Estimated body mass index is 30.85 kg/m² as calculated from the following:    Height as of this encounter: 177.8 cm (70\").    Weight as of this encounter: 97.5 kg (215 lb).           Does the patient have evidence of cognitive impairment? No    Physical Exam  Constitutional:       General: He is not in acute distress.     Appearance: Normal appearance. He is not ill-appearing.   HENT:      Head: Normocephalic and atraumatic.   Eyes:      Extraocular Movements: Extraocular movements intact.   Cardiovascular:      Rate and Rhythm: Normal rate and regular rhythm. Rhythm irregular.      Heart sounds: No murmur heard.  Pulmonary:      Effort: Pulmonary effort is normal. No respiratory distress.   Musculoskeletal:         General: Swelling (trace BLE) present.   Skin:     Comments: Chronic hyperpigmentation in BLE ankles   Neurological:      General: No focal deficit present.      Mental Status: He is alert and oriented to person, place, and time.      Comments: Uvula spasms   Psychiatric:         Mood and Affect: Mood normal.         Behavior: Behavior normal.         Thought Content: Thought content normal.         Judgment: Judgment normal.               HEALTH RISK ASSESSMENT    Smoking Status:  Social History     Tobacco Use   Smoking Status Never    Passive exposure: Past   Smokeless Tobacco Former    Types: Chew     Alcohol Consumption:  Social History     Substance and Sexual Activity   Alcohol Use Not Currently     Fall Risk Screen:    STEADI Fall Risk Assessment was completed, and patient is at MODERATE risk for falls. Assessment completed on:2024    Depression Screenin/25/2024    10:51 AM   PHQ-2/PHQ-9 Depression Screening   Little Interest or Pleasure in Doing Things 0-->not at all   Feeling Down, Depressed or Hopeless 0-->not at all   PHQ-9: " Brief Depression Severity Measure Score 0       Health Habits and Functional and Cognitive Screenin/25/2024    11:12 AM   Functional & Cognitive Status   Do you have difficulty preparing food and eating? No   Do you have difficulty bathing yourself, getting dressed or grooming yourself? No   Do you have difficulty using the toilet? No   Do you have difficulty moving around from place to place? Yes   Do you have trouble with steps or getting out of a bed or a chair? No   Current Diet Well Balanced Diet   Dental Exam Up to date   Eye Exam Up to date   Current Exercises Include Other   Do you need help using the phone?  No   Are you deaf or do you have serious difficulty hearing?  No   Do you need help to go to places out of walking distance? Yes   Do you need help shopping? No   Do you need help preparing meals?  No   Do you need help with housework?  No   Do you need help with laundry? Yes   Do you need help taking your medications? No   Do you need help managing money? No   Do you ever drive or ride in a car without wearing a seat belt? No   Have you felt unusual stress, anger or loneliness in the last month? No   Who do you live with? Spouse   If you need help, do you have trouble finding someone available to you? No   Do you have difficulty concentrating, remembering or making decisions? No       Age-appropriate Screening Schedule:  Refer to the list below for future screening recommendations based on patient's age, sex and/or medical conditions. Orders for these recommended tests are listed in the plan section. The patient has been provided with a written plan.    Health Maintenance   Topic Date Due    TDAP/TD VACCINES (2 - Tdap) 2014    ZOSTER VACCINE (1 of 2) Never done    COVID-19 Vaccine (3 -  season) 2023    COLORECTAL CANCER SCREENING  09/10/2024    INFLUENZA VACCINE  2024    BMI FOLLOWUP  2025    ANNUAL WELLNESS VISIT  2025    HEPATITIS C SCREENING  Completed     Pneumococcal Vaccine 0-64  Aged Out              Assessment & Plan   CMS Preventative Services Quick Reference  Risk Factors Identified During Encounter  Fall Risk-High or Moderate: Discussed Fall Prevention in the home  Immunizations Discussed/Encouraged: Tdap  The above risks/problems have been discussed with the patient.  Follow up actions/plans if indicated are seen below in the Assessment/Plan Section.  Pertinent information has been shared with the patient in the After Visit Summary.    Diagnoses and all orders for this visit:    1. Medicare annual wellness visit, subsequent (Primary)  Comments:  doing well  declines AD  check labs  cologuard ordered  Orders:  -     Comprehensive Metabolic Panel; Future  -     Hemoglobin A1c; Future  -     Lipid Panel; Future  -     PSA Screen; Future  -     Cologuard - Stool, Per Rectum; Future    2. Paroxysmal atrial fibrillation  Comments:  on eliquis and metoprolol  sees cardio  stable at this time    3. Prostate cancer screening  -     PSA Screen; Future    4. Prediabetes  Comments:  check A1C  work on diet an exercise    5. Cerebrovascular accident (CVA), unspecified mechanism  Comments:  stable  sees PT  cont eliquis  sees neuro    6. Essential (primary) hypertension  Comments:  slightly elevated  cont meds  monitor at home    7. Colon cancer screening  -     Cologuard - Stool, Per Rectum; Future    8. Acute pain of left knee  Comments:  likely arthritis  cannot take NSAIDs  current with PT  refer to ortho  Orders:  -     Ambulatory Referral to Orthopedic Surgery        Follow Up:   Return in about 6 months (around 12/25/2024).     An After Visit Summary and PPPS were made available to the patient.

## 2024-09-27 ENCOUNTER — HOSPITAL ENCOUNTER (OUTPATIENT)
Facility: HOSPITAL | Age: 54
Setting detail: OBSERVATION
Discharge: HOME | End: 2024-09-28
Attending: EMERGENCY MEDICINE | Admitting: EMERGENCY MEDICINE
Payer: MEDICARE

## 2024-09-27 ENCOUNTER — APPOINTMENT (OUTPATIENT)
Dept: GENERAL RADIOLOGY | Facility: HOSPITAL | Age: 54
End: 2024-09-27
Payer: MEDICARE

## 2024-09-27 DIAGNOSIS — I48.20 CHRONIC ATRIAL FIBRILLATION: Primary | ICD-10-CM

## 2024-09-27 DIAGNOSIS — R07.9 CHEST PAIN, UNSPECIFIED TYPE: ICD-10-CM

## 2024-09-27 LAB
ALBUMIN SERPL-MCNC: 4 G/DL (ref 3.5–5.2)
ALBUMIN/GLOB SERPL: 1.3 G/DL
ALP SERPL-CCNC: 128 U/L (ref 39–117)
ALT SERPL W P-5'-P-CCNC: 20 U/L (ref 1–41)
ANION GAP SERPL CALCULATED.3IONS-SCNC: 7.5 MMOL/L (ref 5–15)
AST SERPL-CCNC: 25 U/L (ref 1–40)
BASOPHILS # BLD AUTO: 0.02 10*3/MM3 (ref 0–0.2)
BASOPHILS NFR BLD AUTO: 0.3 % (ref 0–1.5)
BILIRUB SERPL-MCNC: 0.5 MG/DL (ref 0–1.2)
BUN SERPL-MCNC: 11 MG/DL (ref 6–20)
BUN/CREAT SERPL: 11.2 (ref 7–25)
CALCIUM SPEC-SCNC: 9.3 MG/DL (ref 8.6–10.5)
CHLORIDE SERPL-SCNC: 104 MMOL/L (ref 98–107)
CHOLEST SERPL-MCNC: 106 MG/DL (ref 0–200)
CO2 SERPL-SCNC: 28.5 MMOL/L (ref 22–29)
CREAT SERPL-MCNC: 0.98 MG/DL (ref 0.76–1.27)
DEPRECATED RDW RBC AUTO: 42.6 FL (ref 37–54)
EGFRCR SERPLBLD CKD-EPI 2021: 90.5 ML/MIN/1.73
EOSINOPHIL # BLD AUTO: 0.04 10*3/MM3 (ref 0–0.4)
EOSINOPHIL NFR BLD AUTO: 0.6 % (ref 0.3–6.2)
ERYTHROCYTE [DISTWIDTH] IN BLOOD BY AUTOMATED COUNT: 12.9 % (ref 12.3–15.4)
GEN 5 2HR TROPONIN T REFLEX: 10 NG/L
GLOBULIN UR ELPH-MCNC: 3.1 GM/DL
GLUCOSE SERPL-MCNC: 110 MG/DL (ref 65–99)
HBA1C MFR BLD: 6.29 % (ref 4.8–5.6)
HCT VFR BLD AUTO: 48.8 % (ref 37.5–51)
HDLC SERPL-MCNC: 44 MG/DL (ref 40–60)
HGB BLD-MCNC: 15.7 G/DL (ref 13–17.7)
HOLD SPECIMEN: NORMAL
HOLD SPECIMEN: NORMAL
IMM GRANULOCYTES # BLD AUTO: 0.02 10*3/MM3 (ref 0–0.05)
IMM GRANULOCYTES NFR BLD AUTO: 0.3 % (ref 0–0.5)
LDLC SERPL CALC-MCNC: 48 MG/DL (ref 0–100)
LDLC/HDLC SERPL: 1.13 {RATIO}
LYMPHOCYTES # BLD AUTO: 2.33 10*3/MM3 (ref 0.7–3.1)
LYMPHOCYTES NFR BLD AUTO: 34.2 % (ref 19.6–45.3)
MCH RBC QN AUTO: 29.1 PG (ref 26.6–33)
MCHC RBC AUTO-ENTMCNC: 32.2 G/DL (ref 31.5–35.7)
MCV RBC AUTO: 90.4 FL (ref 79–97)
MONOCYTES # BLD AUTO: 0.69 10*3/MM3 (ref 0.1–0.9)
MONOCYTES NFR BLD AUTO: 10.1 % (ref 5–12)
NEUTROPHILS NFR BLD AUTO: 3.71 10*3/MM3 (ref 1.7–7)
NEUTROPHILS NFR BLD AUTO: 54.5 % (ref 42.7–76)
NRBC BLD AUTO-RTO: 0 /100 WBC (ref 0–0.2)
NT-PROBNP SERPL-MCNC: 629 PG/ML (ref 0–900)
PLATELET # BLD AUTO: 207 10*3/MM3 (ref 140–450)
PMV BLD AUTO: 9.7 FL (ref 6–12)
POTASSIUM SERPL-SCNC: 4.3 MMOL/L (ref 3.5–5.2)
PROT SERPL-MCNC: 7.1 G/DL (ref 6–8.5)
RBC # BLD AUTO: 5.4 10*6/MM3 (ref 4.14–5.8)
SODIUM SERPL-SCNC: 140 MMOL/L (ref 136–145)
TRIGL SERPL-MCNC: 61 MG/DL (ref 0–150)
TROPONIN T DELTA: -9 NG/L
TROPONIN T SERPL HS-MCNC: 19 NG/L
VLDLC SERPL-MCNC: 14 MG/DL (ref 5–40)
WBC NRBC COR # BLD AUTO: 6.81 10*3/MM3 (ref 3.4–10.8)
WHOLE BLOOD HOLD COAG: NORMAL
WHOLE BLOOD HOLD SPECIMEN: NORMAL

## 2024-09-27 PROCEDURE — 93005 ELECTROCARDIOGRAM TRACING: CPT | Performed by: EMERGENCY MEDICINE

## 2024-09-27 PROCEDURE — 84484 ASSAY OF TROPONIN QUANT: CPT | Performed by: EMERGENCY MEDICINE

## 2024-09-27 PROCEDURE — 36415 COLL VENOUS BLD VENIPUNCTURE: CPT

## 2024-09-27 PROCEDURE — G0378 HOSPITAL OBSERVATION PER HR: HCPCS

## 2024-09-27 PROCEDURE — 80053 COMPREHEN METABOLIC PANEL: CPT | Performed by: EMERGENCY MEDICINE

## 2024-09-27 PROCEDURE — 80061 LIPID PANEL: CPT | Performed by: INTERNAL MEDICINE

## 2024-09-27 PROCEDURE — 83880 ASSAY OF NATRIURETIC PEPTIDE: CPT | Performed by: INTERNAL MEDICINE

## 2024-09-27 PROCEDURE — 85025 COMPLETE CBC W/AUTO DIFF WBC: CPT | Performed by: EMERGENCY MEDICINE

## 2024-09-27 PROCEDURE — 93005 ELECTROCARDIOGRAM TRACING: CPT

## 2024-09-27 PROCEDURE — 83036 HEMOGLOBIN GLYCOSYLATED A1C: CPT | Performed by: INTERNAL MEDICINE

## 2024-09-27 PROCEDURE — 71045 X-RAY EXAM CHEST 1 VIEW: CPT

## 2024-09-27 PROCEDURE — 99285 EMERGENCY DEPT VISIT HI MDM: CPT

## 2024-09-27 RX ORDER — DILTIAZEM HCL 60 MG
60 TABLET ORAL EVERY 12 HOURS SCHEDULED
Status: DISCONTINUED | OUTPATIENT
Start: 2024-09-27 | End: 2024-09-28 | Stop reason: HOSPADM

## 2024-09-27 RX ORDER — MORPHINE SULFATE 2 MG/ML
1 INJECTION, SOLUTION INTRAMUSCULAR; INTRAVENOUS EVERY 4 HOURS PRN
Status: DISCONTINUED | OUTPATIENT
Start: 2024-09-27 | End: 2024-09-28 | Stop reason: HOSPADM

## 2024-09-27 RX ORDER — AMOXICILLIN 250 MG
2 CAPSULE ORAL 2 TIMES DAILY
Status: DISCONTINUED | OUTPATIENT
Start: 2024-09-27 | End: 2024-09-28 | Stop reason: HOSPADM

## 2024-09-27 RX ORDER — ONDANSETRON 2 MG/ML
4 INJECTION INTRAMUSCULAR; INTRAVENOUS EVERY 6 HOURS PRN
Status: DISCONTINUED | OUTPATIENT
Start: 2024-09-27 | End: 2024-09-28 | Stop reason: HOSPADM

## 2024-09-27 RX ORDER — NITROGLYCERIN 0.4 MG/1
0.4 TABLET SUBLINGUAL
Status: DISCONTINUED | OUTPATIENT
Start: 2024-09-27 | End: 2024-09-27

## 2024-09-27 RX ORDER — NALOXONE HCL 0.4 MG/ML
0.4 VIAL (ML) INJECTION
Status: DISCONTINUED | OUTPATIENT
Start: 2024-09-27 | End: 2024-09-28 | Stop reason: HOSPADM

## 2024-09-27 RX ORDER — BISACODYL 10 MG
10 SUPPOSITORY, RECTAL RECTAL DAILY PRN
Status: DISCONTINUED | OUTPATIENT
Start: 2024-09-27 | End: 2024-09-28 | Stop reason: HOSPADM

## 2024-09-27 RX ORDER — METOPROLOL TARTRATE 50 MG
50 TABLET ORAL EVERY 12 HOURS SCHEDULED
Status: DISCONTINUED | OUTPATIENT
Start: 2024-09-27 | End: 2024-09-28 | Stop reason: HOSPADM

## 2024-09-27 RX ORDER — SODIUM CHLORIDE 0.9 % (FLUSH) 0.9 %
10 SYRINGE (ML) INJECTION AS NEEDED
Status: DISCONTINUED | OUTPATIENT
Start: 2024-09-27 | End: 2024-09-28 | Stop reason: HOSPADM

## 2024-09-27 RX ORDER — ASPIRIN 81 MG/1
324 TABLET, CHEWABLE ORAL ONCE
Status: COMPLETED | OUTPATIENT
Start: 2024-09-27 | End: 2024-09-27

## 2024-09-27 RX ORDER — ATORVASTATIN CALCIUM 40 MG/1
40 TABLET, FILM COATED ORAL NIGHTLY
Status: DISCONTINUED | OUTPATIENT
Start: 2024-09-27 | End: 2024-09-28 | Stop reason: HOSPADM

## 2024-09-27 RX ORDER — BISACODYL 5 MG/1
5 TABLET, DELAYED RELEASE ORAL DAILY PRN
Status: DISCONTINUED | OUTPATIENT
Start: 2024-09-27 | End: 2024-09-28 | Stop reason: HOSPADM

## 2024-09-27 RX ORDER — NITROGLYCERIN 0.4 MG/1
0.4 TABLET SUBLINGUAL
Status: DISCONTINUED | OUTPATIENT
Start: 2024-09-27 | End: 2024-09-28 | Stop reason: HOSPADM

## 2024-09-27 RX ORDER — POLYETHYLENE GLYCOL 3350 17 G/17G
17 POWDER, FOR SOLUTION ORAL DAILY PRN
Status: DISCONTINUED | OUTPATIENT
Start: 2024-09-27 | End: 2024-09-28 | Stop reason: HOSPADM

## 2024-09-27 RX ADMIN — ATORVASTATIN CALCIUM 40 MG: 40 TABLET, FILM COATED ORAL at 22:47

## 2024-09-27 RX ADMIN — METOPROLOL TARTRATE 50 MG: 50 TABLET, FILM COATED ORAL at 22:47

## 2024-09-27 RX ADMIN — NITROGLYCERIN 0.4 MG: 0.4 TABLET SUBLINGUAL at 13:03

## 2024-09-27 RX ADMIN — DILTIAZEM HYDROCHLORIDE 60 MG: 60 TABLET ORAL at 22:47

## 2024-09-27 RX ADMIN — SENNOSIDES AND DOCUSATE SODIUM 2 TABLET: 50; 8.6 TABLET ORAL at 22:47

## 2024-09-27 RX ADMIN — ASPIRIN 81 MG CHEWABLE TABLET 324 MG: 81 TABLET CHEWABLE at 12:40

## 2024-09-27 RX ADMIN — APIXABAN 5 MG: 5 TABLET, FILM COATED ORAL at 22:47

## 2024-09-27 NOTE — ED PROVIDER NOTES
Subjective   History of Present Illness  54-year-old male with history of chronic atrial fibrillation and previous stroke who states he has had some substernal sharp chest pain since around 9:30 AM this morning but came on after he yelled at a To get out of the room.  He reports no associated shortness of breath or cough or fever.  He describes the pain as somewhat sharp in nature is nonradiating and intermittent since the time of onset.  He reports no relieving or exacerbating factors.  Review of Systems    Past Medical History:   Diagnosis Date    AF (paroxysmal atrial fibrillation)     Atrial flutter     Injury of back     spinal fusion    Stroke     2016       No Known Allergies    Past Surgical History:   Procedure Laterality Date    CARDIAC SURGERY  2009    Ablation    JOINT REPLACEMENT  2011    Hip replacement    REVISION OF TOTAL HIP FEMORAL Left     SPINE SURGERY  2007    Fusion    VASECTOMY  2005       No family history on file.    Social History     Socioeconomic History    Marital status:    Tobacco Use    Smoking status: Never     Passive exposure: Past    Smokeless tobacco: Former     Types: Chew   Vaping Use    Vaping status: Never Used   Substance and Sexual Activity    Alcohol use: Not Currently    Drug use: Never    Sexual activity: Yes     Partners: Female     Birth control/protection: Tubal ligation, Vasectomy       Prior to Admission medications    Medication Sig Start Date End Date Taking? Authorizing Provider   apixaban (Eliquis) 5 MG tablet tablet TAKE 1 TABLET BY MOUTH TWICE DAILY 5/2/24   Giancarlo Birch MD   atorvastatin (LIPITOR) 40 MG tablet TAKE 1 TABLET BY MOUTH EVERY NIGHT AT BEDTIME 3/4/24   Giancarlo Birch MD   dilTIAZem (CARDIZEM) 60 MG tablet TAKE 1 TABLET BY MOUTH TWICE DAILY 5/17/24   Giancarlo Birch MD   metoprolol tartrate (LOPRESSOR) 50 MG tablet TAKE 1 TABLET BY MOUTH TWICE DAILY 3/4/24   Giancarlo Birch MD     Blood pressure 136/87, pulse 88, temperature 98.1  "°F (36.7 °C), temperature source Oral, resp. rate 22, height 177.8 cm (70\"), weight 98.9 kg (218 lb), SpO2 97%.      Objective   Physical Exam  General: Well-developed well-appearing, no acute distress, alert and appropriate  Eyes:  sclera nonicteric  HEENT: Mucous membranes moist, no mucosal swelling  Neck: Supple, no nuchal rigidity,  Respirations: Respirations nonlabored, equal breath sounds bilaterally, clear lungs  Heart regular rhythm, regular rate, no murmurs  Abdomen soft nontender nondistended, no hepatosplenomegaly, no hernia, no mass, normal bowel sounds, no CVA tenderness  Extremities no clubbing cyanosis or edema, calves are symmetric and nontender  Neuro some mild dysarthria from previous stroke  Psych oriented, pleasant affect  Skin no rash, brisk cap refill  Procedures           ED Course      Results for orders placed or performed during the hospital encounter of 09/27/24   Comprehensive Metabolic Panel    Specimen: Arm, Left; Blood   Result Value Ref Range    Glucose 110 (H) 65 - 99 mg/dL    BUN 11 6 - 20 mg/dL    Creatinine 0.98 0.76 - 1.27 mg/dL    Sodium 140 136 - 145 mmol/L    Potassium 4.3 3.5 - 5.2 mmol/L    Chloride 104 98 - 107 mmol/L    CO2 28.5 22.0 - 29.0 mmol/L    Calcium 9.3 8.6 - 10.5 mg/dL    Total Protein 7.1 6.0 - 8.5 g/dL    Albumin 4.0 3.5 - 5.2 g/dL    ALT (SGPT) 20 1 - 41 U/L    AST (SGOT) 25 1 - 40 U/L    Alkaline Phosphatase 128 (H) 39 - 117 U/L    Total Bilirubin 0.5 0.0 - 1.2 mg/dL    Globulin 3.1 gm/dL    A/G Ratio 1.3 g/dL    BUN/Creatinine Ratio 11.2 7.0 - 25.0    Anion Gap 7.5 5.0 - 15.0 mmol/L    eGFR 90.5 >60.0 mL/min/1.73   High Sensitivity Troponin T    Specimen: Arm, Left; Blood   Result Value Ref Range    HS Troponin T 19 <22 ng/L   CBC Auto Differential    Specimen: Arm, Left; Blood   Result Value Ref Range    WBC 6.81 3.40 - 10.80 10*3/mm3    RBC 5.40 4.14 - 5.80 10*6/mm3    Hemoglobin 15.7 13.0 - 17.7 g/dL    Hematocrit 48.8 37.5 - 51.0 %    MCV 90.4 79.0 - " 97.0 fL    MCH 29.1 26.6 - 33.0 pg    MCHC 32.2 31.5 - 35.7 g/dL    RDW 12.9 12.3 - 15.4 %    RDW-SD 42.6 37.0 - 54.0 fl    MPV 9.7 6.0 - 12.0 fL    Platelets 207 140 - 450 10*3/mm3    Neutrophil % 54.5 42.7 - 76.0 %    Lymphocyte % 34.2 19.6 - 45.3 %    Monocyte % 10.1 5.0 - 12.0 %    Eosinophil % 0.6 0.3 - 6.2 %    Basophil % 0.3 0.0 - 1.5 %    Immature Grans % 0.3 0.0 - 0.5 %    Neutrophils, Absolute 3.71 1.70 - 7.00 10*3/mm3    Lymphocytes, Absolute 2.33 0.70 - 3.10 10*3/mm3    Monocytes, Absolute 0.69 0.10 - 0.90 10*3/mm3    Eosinophils, Absolute 0.04 0.00 - 0.40 10*3/mm3    Basophils, Absolute 0.02 0.00 - 0.20 10*3/mm3    Immature Grans, Absolute 0.02 0.00 - 0.05 10*3/mm3    nRBC 0.0 0.0 - 0.2 /100 WBC   High Sensitivity Troponin T 2Hr    Specimen: Blood   Result Value Ref Range    HS Troponin T 10 <22 ng/L    Troponin T Delta -9 (L) >=-4 - <+4 ng/L   ECG 12 Lead Chest Pain   Result Value Ref Range    QT Interval 369 ms    QTC Interval 460 ms   Green Top (Gel)   Result Value Ref Range    Extra Tube Hold for add-ons.    Lavender Top   Result Value Ref Range    Extra Tube hold for add-on    Gold Top - SST   Result Value Ref Range    Extra Tube Hold for add-ons.    Light Blue Top   Result Value Ref Range    Extra Tube Hold for add-ons.      XR Chest 1 View    Result Date: 9/27/2024  Impression: No acute cardiopulmonary findings. Electronically Signed: Celia Aggarwal MD  9/27/2024 12:32 PM EDT  Workstation ID: [id number]                                          Medical Decision Making  Differential diagnosis including acute coronary syndrome, pneumonia, pneumothorax, pulmonary embolus, dissection    Patient was chest pain-free during the emergency room course.  Notably is not having back pain to suggest dissection.  Pulmonary was felt to be unlikely as no signs of DVT, he is on chronic anticoagulation.  Patient was ordered aspirin and monitoring.  He was advised of findings and agreeable plan of hospitalization with  cardiology consultation for further evaluation of his chest pain.    Problems Addressed:  Chest pain, unspecified type: complicated acute illness or injury  Chronic atrial fibrillation: complicated acute illness or injury    Amount and/or Complexity of Data Reviewed  Labs: ordered. Decision-making details documented in ED Course.     Details: CBC normal, no leukocytosis, high sensitive troponin normal, delta reveals a significant drop, comprehensive metabolic panel essentially normal  Radiology: ordered and independent interpretation performed.     Details: My independent interpretation of chest x-ray image no apparent acute cardiopulmonary process  ECG/medicine tests: ordered and independent interpretation performed.     Details: My EKG interpretation atrial fibrillation rate of 93    Risk  OTC drugs.  Prescription drug management.  Decision regarding hospitalization.        Final diagnoses:   Chronic atrial fibrillation   Chest pain, unspecified type       ED Disposition  ED Disposition       ED Disposition   Decision to Admit    Condition   --    Comment   --               No follow-up provider specified.       Medication List      No changes were made to your prescriptions during this visit.            Ronaldo Brownlee MD  09/27/24 9166

## 2024-09-27 NOTE — CASE MANAGEMENT/SOCIAL WORK
Discharge Planning Assessment   Carlos     Patient Name: Claudy Pabon  MRN: [id number]  Today's Date: 9/27/2024    Admit Date: 9/27/2024    Plan: Home with family. Wife, Viki will transport at d/c.   Discharge Needs Assessment       Row Name 09/27/24 1729       Living Environment    People in Home spouse    Name(s) of People in Home wife Viki    Current Living Arrangements home    Potentially Unsafe Housing Conditions none    In the past 12 months has the electric, gas, oil, or water company threatened to shut off services in your home? No    Primary Care Provided by self    Provides Primary Care For no one    Family Caregiver if Needed spouse    Quality of Family Relationships helpful;involved;supportive    Able to Return to Prior Arrangements yes       Resource/Environmental Concerns    Resource/Environmental Concerns none    Transportation Concerns none       Transportation Needs    In the past 12 months, has lack of transportation kept you from medical appointments or from getting medications? no    In the past 12 months, has lack of transportation kept you from meetings, work, or from getting things needed for daily living? No       Food Insecurity    Within the past 12 months, you worried that your food would run out before you got the money to buy more. Never true    Within the past 12 months, the food you bought just didn't last and you didn't have money to get more. Never true       Transition Planning    Patient/Family Anticipates Transition to home with family    Patient/Family Anticipated Services at Transition none    Transportation Anticipated car, drives self;family or friend will provide       Discharge Needs Assessment    Readmission Within the Last 30 Days no previous admission in last 30 days    Equipment Currently Used at Home shower chair;walker, rolling    Concerns to be Addressed no discharge needs identified    Anticipated Changes Related to Illness none    Equipment Needed After  Discharge none              Discharge Plan       Row Name 09/27/24 1730       Plan    Plan Home with family. Wife, Viki will transport at d/c.    Patient/Family in Agreement with Plan yes    Plan Comments CM met with patient and wife at bedside. Pt lives at home with spouse, IADLs. DMEs: walker, shower chair. Pt does not currently have HH/PT/OT but previously obtained OP PT with [facility]. Pt states he is currently out of PT days, if PT is recommended, pt would like to follow up with PCP after d/c once PT benefits are restored. Pt does not have financial needs with food, medication, or utilities. DC Barriers: Cardiac monitoring, IVF.              Demographic Summary       Row Name 09/27/24 1726       General Information    Admission Type observation    Arrived From emergency department    Referral Source admission list    Reason for Consult discharge planning    Preferred Language English       Contact Information    Permission Granted to Share Info With               Functional Status       Row Name 09/27/24 1728       Functional Status    Usual Activity Tolerance moderate    Current Activity Tolerance moderate       Functional Status, IADL    Medications independent    Meal Preparation assistive equipment and person    Housekeeping assistive equipment and person    Laundry assistive equipment and person    Shopping assistive equipment and person       Mental Status    General Appearance WDL WDL       Mental Status Summary    Recent Changes in Mental Status/Cognitive Functioning no changes       Employment/    Employment Status disabled              Patient Forms       Row Name 09/27/24 1727       Patient Forms    Important Message from Medicare (IMM) --  moon 9/26/24 per registration    Patient Observation Letter Delivered           Mercy Lewis RN    phone [phone]  fax [phone]

## 2024-09-27 NOTE — CONSULTS
Referring Provider: Ronaldo Brownlee MD    Reason for Consultation: Chest pain      Patient Care Team:  Eugenia John APRN as PCP - General (Nurse Practitioner)  Giancarlo Birch MD as Consulting Physician (Cardiology)      SUBJECTIVE     Chief Complaint: Chest pain    History of present illness:  Claudy Pabon is a 54 y.o. male with hypertension, hyperlipidemia, persistent atrial fibrillation, mitral regurgitation, history of stroke, history of movement disorder on Keppra who has been admitted to the hospital for evaluation of chest pain.  Cardiology has been consulted for further workup.      Review of systems:    Constitutional: No weakness, fatigue, fever, rigors, chills   Eyes: No vision changes, eye pain   ENT/oropharynx: No difficulty swallowing, sore throat, epistaxis, changes in hearing   Cardiovascular: + chest pain, chest tightness, palpitations, paroxysmal nocturnal dyspnea, orthopnea, diaphoresis, dizziness / syncopal episode   Respiratory: No shortness of breath, dyspnea on exertion, cough, wheezing, hemoptysis   Gastrointestinal: No abdominal pain, nausea, vomiting, diarrhea, bloody stools   Genitourinary: No hematuria, dysuria   Neurological: No headache, tremors, numbness, one-sided weakness    Musculoskeletal: No cramps, myalgias, joint pain, joint swelling   Integument: No rash, edema        Personal History:      Past Medical History:   Diagnosis Date    AF (paroxysmal atrial fibrillation)     Atrial flutter     Injury of back     spinal fusion    Stroke     2016       Past Surgical History:   Procedure Laterality Date    CARDIAC SURGERY  2009    Ablation    JOINT REPLACEMENT  2011    Hip replacement    REVISION OF TOTAL HIP FEMORAL Left     SPINE SURGERY  2007    Fusion    VASECTOMY  2005       No family history on file.    Social History     Tobacco Use    Smoking status: Never     Passive exposure: Past    Smokeless tobacco: Former     Types: Chew   Vaping Use    Vaping status: Never  "Used   Substance Use Topics    Alcohol use: Not Currently    Drug use: Never        Home meds:  Prior to Admission medications    Medication Sig Start Date End Date Taking? Authorizing Provider   apixaban (Eliquis) 5 MG tablet tablet TAKE 1 TABLET BY MOUTH TWICE DAILY 5/2/24  Yes Giancarlo Birch MD   atorvastatin (LIPITOR) 40 MG tablet TAKE 1 TABLET BY MOUTH EVERY NIGHT AT BEDTIME 3/4/24  Yes Giancarlo Birch MD   dilTIAZem (CARDIZEM) 60 MG tablet TAKE 1 TABLET BY MOUTH TWICE DAILY 5/17/24  Yes Giancarlo Birch MD   metoprolol tartrate (LOPRESSOR) 50 MG tablet TAKE 1 TABLET BY MOUTH TWICE DAILY 3/4/24  Yes Giancarlo Birch MD       Allergies:     Patient has no known allergies.    Scheduled Meds:senna-docusate sodium, 2 tablet, Oral, BID      Continuous Infusions:   PRN Meds:  senna-docusate sodium **AND** polyethylene glycol **AND** bisacodyl **AND** bisacodyl    melatonin    Morphine **AND** naloxone    nitroglycerin    ondansetron    sodium chloride      OBJECTIVE    Vital Signs  Vitals:    09/27/24 1328 09/27/24 1508 09/27/24 1720 09/27/24 1741   BP: 122/93 136/87 143/93 147/93   BP Location:    Right arm   Patient Position:    Lying   Pulse: 71 88 91 89   Resp: 12 22 16 21   Temp:    97.9 °F (36.6 °C)   TempSrc:    Oral   SpO2: 94% 97% 94% 97%   Weight:       Height:           Flowsheet Rows      Flowsheet Row First Filed Value   Admission Height 177.8 cm (70\") Documented at 09/27/2024 1156   Admission Weight 98.9 kg (218 lb) Documented at 09/27/2024 1156            No intake or output data in the 24 hours ending 09/27/24 1756     Telemetry: Atrial fibrillation    Physical Exam:  The patient is alert, oriented and in no distress.  Obese  Vital signs as noted above.  Head and neck revealed no carotid bruits or jugular venous distention.  No thyromegaly or lymphadenopathy is present  Lungs clear.  No wheezing.  Breath sounds are normal bilaterally.  Heart: Normal first and second heart sounds. No murmur.  No " precordial rub is present.  No gallop is present.  Abdomen: Soft and nontender.  No organomegaly is present.  Extremities with good peripheral pulses without any pedal edema.  Skin: Warm and dry.  Musculoskeletal system is grossly normal.  CNS grossly normal.       Results Review:  I have personally reviewed the results from the time of this admission to 9/27/2024 17:56 EDT and agree with these findings:  []  Laboratory  []  Microbiology  []  Radiology  []  EKG/Telemetry   []  Cardiology/Vascular   []  Pathology  []  Old records  []  Other:    Most notable findings include:     Lab Results (last 24 hours)       Procedure Component Value Units Date/Time    High Sensitivity Troponin T 2Hr [[id number]]  (Abnormal) Collected: 09/27/24 1502    Specimen: Blood Updated: 09/27/24 1531     HS Troponin T 10 ng/L      Troponin T Delta -9 ng/L     Narrative:      High Sensitive Troponin T Reference Range:  <14.0 ng/L- Negative Female for AMI  <22.0 ng/L- Negative Male for AMI  >=14 - Abnormal Female indicating possible myocardial injury.  >=22 - Abnormal Male indicating possible myocardial injury.   Clinicians would have to utilize clinical acumen, EKG, Troponin, and serial changes to determine if it is an Acute Myocardial Infarction or myocardial injury due to an underlying chronic condition.         Comprehensive Metabolic Panel [[id number]]  (Abnormal) Collected: 09/27/24 1234    Specimen: Blood from Arm, Left Updated: 09/27/24 1316     Glucose 110 mg/dL      BUN 11 mg/dL      Creatinine 0.98 mg/dL      Sodium 140 mmol/L      Potassium 4.3 mmol/L      Comment: Slight hemolysis detected by analyzer. Result may be falsely elevated.        Chloride 104 mmol/L      CO2 28.5 mmol/L      Calcium 9.3 mg/dL      Total Protein 7.1 g/dL      Albumin 4.0 g/dL      ALT (SGPT) 20 U/L      AST (SGOT) 25 U/L      Alkaline Phosphatase 128 U/L      Total Bilirubin 0.5 mg/dL      Globulin 3.1 gm/dL      A/G Ratio 1.3 g/dL      BUN/Creatinine  Ratio 11.2     Anion Gap 7.5 mmol/L      eGFR 90.5 mL/min/1.73     Narrative:      GFR Normal >60  Chronic Kidney Disease <60  Kidney Failure <15      High Sensitivity Troponin T [[id number]]  (Normal) Collected: 09/27/24 1234    Specimen: Blood from Arm, Left Updated: 09/27/24 1308     HS Troponin T 19 ng/L     Narrative:      High Sensitive Troponin T Reference Range:  <14.0 ng/L- Negative Female for AMI  <22.0 ng/L- Negative Male for AMI  >=14 - Abnormal Female indicating possible myocardial injury.  >=22 - Abnormal Male indicating possible myocardial injury.   Clinicians would have to utilize clinical acumen, EKG, Troponin, and serial changes to determine if it is an Acute Myocardial Infarction or myocardial injury due to an underlying chronic condition.         Anthony Draw [[id number]] Collected: 09/27/24 1234    Specimen: Blood from Arm, Left Updated: 09/27/24 1245    Narrative:      The following orders were created for panel order Anthony Draw.  Procedure                               Abnormality         Status                     ---------                               -----------         ------                     Green Top (Gel)[[id number]]                                  Final result               Lavender Top[[id number]]                                     Final result               Gold Top - SST[[id number]]                                   Final result               Light Blue Top[[id number]]                                   Final result                 Please view results for these tests on the individual orders.    Green Top (Gel) [[id number]] Collected: 09/27/24 1234    Specimen: Blood from Arm, Left Updated: 09/27/24 1245     Extra Tube Hold for add-ons.     Comment: Auto resulted.       Lavender Top [[id number]] Collected: 09/27/24 1234    Specimen: Blood from Arm, Left Updated: 09/27/24 1245     Extra Tube hold for add-on     Comment: Auto resulted       Gold Top - SST [[id number]] Collected:  09/27/24 1234    Specimen: Blood from Arm, Left Updated: 09/27/24 1245     Extra Tube Hold for add-ons.     Comment: Auto resulted.       Light Blue Top [[id number]] Collected: 09/27/24 1234    Specimen: Blood from Arm, Left Updated: 09/27/24 1245     Extra Tube Hold for add-ons.     Comment: Auto resulted       CBC & Differential [[id number]]  (Normal) Collected: 09/27/24 1234    Specimen: Blood from Arm, Left Updated: 09/27/24 1242    Narrative:      The following orders were created for panel order CBC & Differential.  Procedure                               Abnormality         Status                     ---------                               -----------         ------                     CBC Auto Differential[[id number]]        Normal              Final result                 Please view results for these tests on the individual orders.    CBC Auto Differential [[id number]]  (Normal) Collected: 09/27/24 1234    Specimen: Blood from Arm, Left Updated: 09/27/24 1242     WBC 6.81 10*3/mm3      RBC 5.40 10*6/mm3      Hemoglobin 15.7 g/dL      Hematocrit 48.8 %      MCV 90.4 fL      MCH 29.1 pg      MCHC 32.2 g/dL      RDW 12.9 %      RDW-SD 42.6 fl      MPV 9.7 fL      Platelets 207 10*3/mm3      Neutrophil % 54.5 %      Lymphocyte % 34.2 %      Monocyte % 10.1 %      Eosinophil % 0.6 %      Basophil % 0.3 %      Immature Grans % 0.3 %      Neutrophils, Absolute 3.71 10*3/mm3      Lymphocytes, Absolute 2.33 10*3/mm3      Monocytes, Absolute 0.69 10*3/mm3      Eosinophils, Absolute 0.04 10*3/mm3      Basophils, Absolute 0.02 10*3/mm3      Immature Grans, Absolute 0.02 10*3/mm3      nRBC 0.0 /100 WBC             Imaging Results (Last 24 Hours)       Procedure Component Value Units Date/Time    XR Chest 1 View [[id number]] Collected: 09/27/24 1231     Updated: 09/27/24 1234    Narrative:      XR CHEST 1 VW    Date of Exam: 9/27/2024 12:27 PM EDT    Indication: Chest Pain Protocol  Chest Pain Protocol    Comparison: CT  chest 12/4/2020. PA and lateral chest 6/22/2015.    Findings:  Lungs are clear. Heart size is within normal limits. There is no pleural effusion, pneumothorax or acute osseous abnormality.      Impression:      Impression:  No acute cardiopulmonary findings.      Electronically Signed: Celia Aggarwal MD    9/27/2024 12:32 PM EDT    Workstation ID: [id number]            LAB RESULTS (LAST 7 DAYS)    CBC  Results from last 7 days   Lab Units 09/27/24  1234   WBC 10*3/mm3 6.81   RBC 10*6/mm3 5.40   HEMOGLOBIN g/dL 15.7   HEMATOCRIT % 48.8   MCV fL 90.4   PLATELETS 10*3/mm3 207       BMP  Results from last 7 days   Lab Units 09/27/24  1234   SODIUM mmol/L 140   POTASSIUM mmol/L 4.3   CHLORIDE mmol/L 104   CO2 mmol/L 28.5   BUN mg/dL 11   CREATININE mg/dL 0.98   GLUCOSE mg/dL 110*       CMP   Results from last 7 days   Lab Units 09/27/24  1234   SODIUM mmol/L 140   POTASSIUM mmol/L 4.3   CHLORIDE mmol/L 104   CO2 mmol/L 28.5   BUN mg/dL 11   CREATININE mg/dL 0.98   GLUCOSE mg/dL 110*   ALBUMIN g/dL 4.0   BILIRUBIN mg/dL 0.5   ALK PHOS U/L 128*   AST (SGOT) U/L 25   ALT (SGPT) U/L 20       BNP        TROPONIN  Results from last 7 days   Lab Units 09/27/24  1502   HSTROP T ng/L 10       CoAg        Creatinine Clearance  Estimated Creatinine Clearance: 99.3 mL/min (by C-G formula based on SCr of 0.98 mg/dL).    ABG          Radiology  XR Chest 1 View    Result Date: 9/27/2024  Impression: No acute cardiopulmonary findings. Electronically Signed: Celia Aggarwal MD  9/27/2024 12:32 PM EDT  Workstation ID: [id number]       EKG  I personally viewed and interpreted the patient's EKG/Telemetry data:  ECG 12 Lead Chest Pain   Preliminary Result   HEART RATE=93  bpm   RR Axvpjrwn=447  ms   IN Interval=  ms   P Horizontal Axis=  deg   P Front Axis=  deg   QRSD Interval=87  ms   QT Mxycrwuu=146  ms   WDjQ=771  ms   QRS Axis=51  deg   T Wave Axis=4  deg   - ABNORMAL ECG -   Atrial fibrillation   Date and Time of Study:2024-09-27 12:04:33       ECG 12 Lead Chest Pain    (Results Pending)         Echocardiogram:    Results for orders placed during the hospital encounter of 04/26/23    Adult Transthoracic Echo Complete W/ Cont if Necessary Per Protocol    Interpretation Summary    Estimated right ventricular systolic pressure from tricuspid regurgitation is normal (<35 mmHg).    Indications  Arrhythmia    Technically satisfactory study.  Mitral valve is structurally normal.  Tricuspid valve is structurally normal.  Aortic valve is structurally normal.  Pulmonic valve could not be well visualized.  Moderate to significant mitral and moderate tricuspid regurgitation.  Left atrium is enlarged.  Right atrium is normal in size.  Left ventricle is normal in size and contractility with ejection fraction of 60%.  Right ventricle is normal in size.  Atrial septum is intact.  Aorta is normal.  No pericardial effusion or intracardiac thrombus is seen.    Impression  Moderate to significant mitral and moderate tricuspid regurgitation.  Calculated pulmonary artery pressure is 40 mmHg.  Left atrial enlargement.  Left ventricular size and contractility is normal with ejection fraction of 60%.        Stress Test:        Cardiac Catheterization:  No results found for this or any previous visit.        Other:      ASSESSMENT & PLAN:    Principal Problem:    Chest pain    Chest pain  Atypical chest pain.  ECG shows atrial fibrillation but no evidence of ischemia.  High-sensitivity troponin is 19 and 10.  We will obtain a nuclear stress test    Atrial fibrillation  [id number] score is 3  Continue Eliquis 5 mg p.o. twice daily.  Continue metoprolol and Cardizem for rate control    Hypertension  Resume Cardizem and metoprolol.  Can add ACE inhibitor or ARB if needed for better blood pressure control    Hyperlipidemia  Continue high intensity statin  LDL 48, HDL 43, triglycerides 47 and total cholesterol 103  A1c 6.2    Valvular heart disease  HFpEF secondary to  hypertension and valvular heart disease.  Previous echocardiogram showed severely enlarged bilateral atria and secondary mitral and tricuspid valve regurgitation.  Repeat echocardiogram to follow-up on valvular heart disease and LV dimension  Obtain a proBNP    Horacio Huddleston MD  09/27/24  17:56 EDT

## 2024-09-28 ENCOUNTER — APPOINTMENT (OUTPATIENT)
Dept: NUCLEAR MEDICINE | Facility: HOSPITAL | Age: 54
End: 2024-09-28
Payer: MEDICARE

## 2024-09-28 ENCOUNTER — APPOINTMENT (OUTPATIENT)
Dept: CARDIOLOGY | Facility: HOSPITAL | Age: 54
End: 2024-09-28
Payer: MEDICARE

## 2024-09-28 ENCOUNTER — READMISSION MANAGEMENT (OUTPATIENT)
Dept: CALL CENTER | Facility: HOSPITAL | Age: 54
End: 2024-09-28
Payer: MEDICARE

## 2024-09-28 VITALS
BODY MASS INDEX: 31.21 KG/M2 | HEIGHT: 70 IN | HEART RATE: 90 BPM | TEMPERATURE: 98.2 F | SYSTOLIC BLOOD PRESSURE: 136 MMHG | DIASTOLIC BLOOD PRESSURE: 92 MMHG | WEIGHT: 218 LBS | RESPIRATION RATE: 18 BRPM | OXYGEN SATURATION: 96 %

## 2024-09-28 LAB
AORTIC DIMENSIONLESS INDEX: 1.04 (DI)
BASOPHILS # BLD AUTO: 0.03 10*3/MM3 (ref 0–0.2)
BASOPHILS NFR BLD AUTO: 0.4 % (ref 0–1.5)
BH CV ECHO MEAS - AO MAX PG: 3.5 MMHG
BH CV ECHO MEAS - AO MEAN PG: 2 MMHG
BH CV ECHO MEAS - AO V2 MAX: 93.1 CM/SEC
BH CV ECHO MEAS - AO V2 VTI: 16.1 CM
BH CV ECHO MEAS - AVA(I,D): 4.2 CM2
BH CV ECHO MEAS - EDV(CUBED): 85.2 ML
BH CV ECHO MEAS - EDV(MOD-SP2): 67.8 ML
BH CV ECHO MEAS - EDV(MOD-SP4): 83.6 ML
BH CV ECHO MEAS - EF(MOD-BP): 62.3 %
BH CV ECHO MEAS - EF(MOD-SP2): 60.5 %
BH CV ECHO MEAS - EF(MOD-SP4): 61.5 %
BH CV ECHO MEAS - EF_3D-VOL: 62 %
BH CV ECHO MEAS - ESV(CUBED): 19.7 ML
BH CV ECHO MEAS - ESV(MOD-SP2): 26.8 ML
BH CV ECHO MEAS - ESV(MOD-SP4): 32.2 ML
BH CV ECHO MEAS - FS: 38.6 %
BH CV ECHO MEAS - IVS/LVPW: 1.18 CM
BH CV ECHO MEAS - IVSD: 1.3 CM
BH CV ECHO MEAS - LA DIMENSION: 5.1 CM
BH CV ECHO MEAS - LAT PEAK E' VEL: 18.7 CM/SEC
BH CV ECHO MEAS - LV DIASTOLIC VOL/BSA (35-75): 38.6 CM2
BH CV ECHO MEAS - LV MASS(C)D: 191.3 GRAMS
BH CV ECHO MEAS - LV MAX PG: 3.8 MMHG
BH CV ECHO MEAS - LV MEAN PG: 2 MMHG
BH CV ECHO MEAS - LV SYSTOLIC VOL/BSA (12-30): 14.9 CM2
BH CV ECHO MEAS - LV V1 MAX: 97.1 CM/SEC
BH CV ECHO MEAS - LV V1 VTI: 16.1 CM
BH CV ECHO MEAS - LVIDD: 4.4 CM
BH CV ECHO MEAS - LVIDS: 2.7 CM
BH CV ECHO MEAS - LVOT AREA: 4.2 CM2
BH CV ECHO MEAS - LVOT DIAM: 2.3 CM
BH CV ECHO MEAS - LVPWD: 1.1 CM
BH CV ECHO MEAS - MED PEAK E' VEL: 11.7 CM/SEC
BH CV ECHO MEAS - MR MAX PG: 100.4 MMHG
BH CV ECHO MEAS - MR MAX VEL: 501 CM/SEC
BH CV ECHO MEAS - MV DEC SLOPE: 665 CM/SEC2
BH CV ECHO MEAS - MV E MAX VEL: 91.1 CM/SEC
BH CV ECHO MEAS - MV MAX PG: 3.5 MMHG
BH CV ECHO MEAS - MV MEAN PG: 2 MMHG
BH CV ECHO MEAS - MV P1/2T: 34 MSEC
BH CV ECHO MEAS - MV V2 VTI: 10 CM
BH CV ECHO MEAS - MVA(P1/2T): 6.5 CM2
BH CV ECHO MEAS - MVA(VTI): 6.7 CM2
BH CV ECHO MEAS - PA ACC TIME: 0.09 SEC
BH CV ECHO MEAS - PA V2 MAX: 103 CM/SEC
BH CV ECHO MEAS - RAP SYSTOLE: 8 MMHG
BH CV ECHO MEAS - RV MAX PG: 2.35 MMHG
BH CV ECHO MEAS - RV V1 MAX: 76.7 CM/SEC
BH CV ECHO MEAS - RV V1 VTI: 12.2 CM
BH CV ECHO MEAS - RVSP: 36.7 MMHG
BH CV ECHO MEAS - SV(LVOT): 66.9 ML
BH CV ECHO MEAS - SV(MOD-SP2): 41 ML
BH CV ECHO MEAS - SV(MOD-SP4): 51.4 ML
BH CV ECHO MEAS - SVI(LVOT): 30.9 ML/M2
BH CV ECHO MEAS - SVI(MOD-SP2): 18.9 ML/M2
BH CV ECHO MEAS - SVI(MOD-SP4): 23.7 ML/M2
BH CV ECHO MEAS - TAPSE (>1.6): 2.22 CM
BH CV ECHO MEAS - TR MAX PG: 28.7 MMHG
BH CV ECHO MEAS - TR MAX VEL: 268 CM/SEC
BH CV ECHO MEASUREMENTS AVERAGE E/E' RATIO: 5.99
BH CV REST NUCLEAR ISOTOPE DOSE: 11 MCI
BH CV STRESS BP STAGE 1: NORMAL
BH CV STRESS BP STAGE 2: NORMAL
BH CV STRESS COMMENTS STAGE 1: NORMAL
BH CV STRESS COMMENTS STAGE 2: NORMAL
BH CV STRESS DOSE REGADENOSON STAGE 1: 0.4
BH CV STRESS DURATION MIN STAGE 1: 0
BH CV STRESS DURATION MIN STAGE 2: 4
BH CV STRESS DURATION SEC STAGE 1: 10
BH CV STRESS DURATION SEC STAGE 2: 0
BH CV STRESS HR STAGE 1: 111
BH CV STRESS HR STAGE 2: 108
BH CV STRESS NUCLEAR ISOTOPE DOSE: 31.8 MCI
BH CV STRESS PROTOCOL 1: NORMAL
BH CV STRESS RECOVERY BP: NORMAL MMHG
BH CV STRESS RECOVERY HR: 108 BPM
BH CV STRESS STAGE 1: 1
BH CV STRESS STAGE 2: 2
BH CV XLRA - RV BASE: 3.2 CM
BH CV XLRA - TDI S': 11.3 CM/SEC
DEPRECATED RDW RBC AUTO: 42.6 FL (ref 37–54)
EOSINOPHIL # BLD AUTO: 0.08 10*3/MM3 (ref 0–0.4)
EOSINOPHIL NFR BLD AUTO: 1.1 % (ref 0.3–6.2)
ERYTHROCYTE [DISTWIDTH] IN BLOOD BY AUTOMATED COUNT: 12.9 % (ref 12.3–15.4)
HCT VFR BLD AUTO: 50.3 % (ref 37.5–51)
HGB BLD-MCNC: 15.8 G/DL (ref 13–17.7)
IMM GRANULOCYTES # BLD AUTO: 0.02 10*3/MM3 (ref 0–0.05)
IMM GRANULOCYTES NFR BLD AUTO: 0.3 % (ref 0–0.5)
LEFT ATRIUM VOLUME INDEX: 50.2 ML/M2
LV EF NUC BP: 61 %
LYMPHOCYTES # BLD AUTO: 2.11 10*3/MM3 (ref 0.7–3.1)
LYMPHOCYTES NFR BLD AUTO: 28.7 % (ref 19.6–45.3)
MAXIMAL PREDICTED HEART RATE: 164 BPM
MCH RBC QN AUTO: 28.2 PG (ref 26.6–33)
MCHC RBC AUTO-ENTMCNC: 31.4 G/DL (ref 31.5–35.7)
MCV RBC AUTO: 89.8 FL (ref 79–97)
MONOCYTES # BLD AUTO: 0.72 10*3/MM3 (ref 0.1–0.9)
MONOCYTES NFR BLD AUTO: 9.8 % (ref 5–12)
NEUTROPHILS NFR BLD AUTO: 4.39 10*3/MM3 (ref 1.7–7)
NEUTROPHILS NFR BLD AUTO: 59.7 % (ref 42.7–76)
NRBC BLD AUTO-RTO: 0 /100 WBC (ref 0–0.2)
PERCENT MAX PREDICTED HR: 67.68 %
PLATELET # BLD AUTO: 175 10*3/MM3 (ref 140–450)
PMV BLD AUTO: 10.5 FL (ref 6–12)
QT INTERVAL: 369 MS
QTC INTERVAL: 460 MS
RBC # BLD AUTO: 5.6 10*6/MM3 (ref 4.14–5.8)
SINUS: 3.6 CM
STJ: 2.9 CM
STRESS BASELINE BP: NORMAL MMHG
STRESS BASELINE HR: 87 BPM
STRESS PERCENT HR: 80 %
STRESS POST PEAK BP: NORMAL MMHG
STRESS POST PEAK HR: 111 BPM
STRESS TARGET HR: 139 BPM
WBC NRBC COR # BLD AUTO: 7.35 10*3/MM3 (ref 3.4–10.8)

## 2024-09-28 PROCEDURE — G0378 HOSPITAL OBSERVATION PER HR: HCPCS

## 2024-09-28 PROCEDURE — 78452 HT MUSCLE IMAGE SPECT MULT: CPT

## 2024-09-28 PROCEDURE — 25010000002 REGADENOSON 0.4 MG/5ML SOLUTION: Performed by: EMERGENCY MEDICINE

## 2024-09-28 PROCEDURE — 93306 TTE W/DOPPLER COMPLETE: CPT | Performed by: INTERNAL MEDICINE

## 2024-09-28 PROCEDURE — A9502 TC99M TETROFOSMIN: HCPCS | Performed by: EMERGENCY MEDICINE

## 2024-09-28 PROCEDURE — 93017 CV STRESS TEST TRACING ONLY: CPT

## 2024-09-28 PROCEDURE — 93306 TTE W/DOPPLER COMPLETE: CPT

## 2024-09-28 PROCEDURE — 85025 COMPLETE CBC W/AUTO DIFF WBC: CPT | Performed by: EMERGENCY MEDICINE

## 2024-09-28 PROCEDURE — 78452 HT MUSCLE IMAGE SPECT MULT: CPT | Performed by: INTERNAL MEDICINE

## 2024-09-28 PROCEDURE — 0 TECHNETIUM TETROFOSMIN KIT: Performed by: EMERGENCY MEDICINE

## 2024-09-28 PROCEDURE — 93018 CV STRESS TEST I&R ONLY: CPT | Performed by: INTERNAL MEDICINE

## 2024-09-28 RX ORDER — REGADENOSON 0.08 MG/ML
0.4 INJECTION, SOLUTION INTRAVENOUS
Status: COMPLETED | OUTPATIENT
Start: 2024-09-28 | End: 2024-09-28

## 2024-09-28 RX ADMIN — REGADENOSON 0.4 MG: 0.08 INJECTION, SOLUTION INTRAVENOUS at 10:18

## 2024-09-28 RX ADMIN — TETROFOSMIN 1 DOSE: 1.38 INJECTION, POWDER, LYOPHILIZED, FOR SOLUTION INTRAVENOUS at 07:56

## 2024-09-28 RX ADMIN — TETROFOSMIN 1 DOSE: 1.38 INJECTION, POWDER, LYOPHILIZED, FOR SOLUTION INTRAVENOUS at 10:18

## 2024-09-28 NOTE — PLAN OF CARE
Goal Outcome Evaluation:              Outcome Evaluation: myoview normal.  d/c home.  f/u with cardiology

## 2024-09-28 NOTE — PROGRESS NOTES
Referring Provider: Ronaldo Brownlee MD    Reason for follow-up: Chest pain     Patient Care Team:  Eugenia John APRN as PCP - General (Nurse Practitioner)  Giancarlo Birch MD as Consulting Physician (Cardiology)      SUBJECTIVE  Patient is chest pain-free today.  Underwent stress test.     ROS  Review of all systems negative except as indicated.    Since I have last seen, the patient has been without any chest discomfort, shortness of breath, palpitations, dizziness or syncope.  Denies having any headache, abdominal pain, nausea, vomiting, diarrhea, constipation, loss of weight or loss of appetite.  Denies having any excessive bruising, hematuria or blood in the stool.        Personal History:    Past Medical History:   Diagnosis Date    AF (paroxysmal atrial fibrillation)     Atrial flutter     Injury of back     spinal fusion    Stroke     2016       Past Surgical History:   Procedure Laterality Date    CARDIAC SURGERY  2009    Ablation    JOINT REPLACEMENT  2011    Hip replacement    REVISION OF TOTAL HIP FEMORAL Left     SPINE SURGERY  2007    Fusion    VASECTOMY  2005       History reviewed. No pertinent family history.    Social History     Tobacco Use    Smoking status: Never     Passive exposure: Past    Smokeless tobacco: Former     Types: Chew   Vaping Use    Vaping status: Never Used   Substance Use Topics    Alcohol use: Not Currently    Drug use: Never        Home meds:  Prior to Admission medications    Medication Sig Start Date End Date Taking? Authorizing Provider   apixaban (Eliquis) 5 MG tablet tablet TAKE 1 TABLET BY MOUTH TWICE DAILY 5/2/24  Yes Giancarlo Birch MD   atorvastatin (LIPITOR) 40 MG tablet TAKE 1 TABLET BY MOUTH EVERY NIGHT AT BEDTIME 3/4/24  Yes Giancarlo Birch MD   dilTIAZem (CARDIZEM) 60 MG tablet TAKE 1 TABLET BY MOUTH TWICE DAILY 5/17/24  Yes Giancarlo Birch MD   metoprolol tartrate (LOPRESSOR) 50 MG tablet TAKE 1 TABLET BY MOUTH TWICE DAILY 3/4/24  Yes Queta  "MD Giancarlo       Allergies:  Patient has no known allergies.    Scheduled Meds:apixaban, 5 mg, Oral, Q12H  atorvastatin, 40 mg, Oral, Nightly  dilTIAZem, 60 mg, Oral, Q12H  metoprolol tartrate, 50 mg, Oral, Q12H  senna-docusate sodium, 2 tablet, Oral, BID      Continuous Infusions:   PRN Meds:.  senna-docusate sodium **AND** polyethylene glycol **AND** bisacodyl **AND** bisacodyl    melatonin    Morphine **AND** naloxone    nitroglycerin    ondansetron    sodium chloride      OBJECTIVE    Vital Signs  Vitals:    09/27/24 1741 09/27/24 2019 09/27/24 2326 09/28/24 0306   BP: 147/93 144/95 129/89 101/72   BP Location: Right arm Left arm Left arm Left arm   Patient Position: Lying Lying Lying Lying   Pulse: 89 91 76 67   Resp: 21 17 19 17   Temp: 97.9 °F (36.6 °C) 98.4 °F (36.9 °C) 98.2 °F (36.8 °C) 97.8 °F (36.6 °C)   TempSrc: Oral Oral Oral Oral   SpO2: 97% 95% 94% 97%   Weight:       Height:           Flowsheet Rows      Flowsheet Row First Filed Value   Admission Height 177.8 cm (70\") Documented at 09/27/2024 1156   Admission Weight 98.9 kg (218 lb) Documented at 09/27/2024 1156              Intake/Output Summary (Last 24 hours) at 9/28/2024 0605  Last data filed at 9/27/2024 2326  Gross per 24 hour   Intake --   Output 1200 ml   Net -1200 ml          Telemetry: Atrial fibrillation    Physical Exam:  The patient is alert, oriented and in no distress.  Obese  Vital signs as noted above.  Head and neck revealed no carotid bruits or jugular venous distention.  No thyromegaly or lymphadenopathy is present  Lungs clear.  No wheezing.  Breath sounds are normal bilaterally.  Heart normal first and second heart sounds.  No murmur. No precordial rub is present.  No gallop is present.  Abdomen soft and nontender.  No organomegaly is present.  Extremities with good peripheral pulses without any pedal edema.  Skin warm and dry.  Musculoskeletal system is grossly normal.  CNS grossly normal.       Results Review:  I have " personally reviewed the results from the time of this admission to 9/28/2024 06:05 EDT and agree with these findings:  []  Laboratory  []  Microbiology  []  Radiology  []  EKG/Telemetry   []  Cardiology/Vascular   []  Pathology  []  Old records  []  Other:    Most notable findings include:    Lab Results (last 24 hours)       Procedure Component Value Units Date/Time    Lipid Panel [[id number]] Collected: 09/27/24 1234    Specimen: Blood from Arm, Left Updated: 09/27/24 1919     Total Cholesterol 106 mg/dL      Triglycerides 61 mg/dL      HDL Cholesterol 44 mg/dL      LDL Cholesterol  48 mg/dL      VLDL Cholesterol 14 mg/dL      LDL/HDL Ratio 1.13    Narrative:      Cholesterol Reference Ranges  ([place] Department of Health and Human Services ATP III Classifications)    Desirable          <200 mg/dL  Borderline High    200-239 mg/dL  High Risk          >240 mg/dL      Triglyceride Reference Ranges  ([place].S. Department of Health and Human Services ATP III Classifications)    Normal           <150 mg/dL  Borderline High  150-199 mg/dL  High             200-499 mg/dL  Very High        >500 mg/dL    HDL Reference Ranges  ([place].S. Department of Health and Human Services ATP III Classifications)    Low     <40 mg/dl (major risk factor for CHD)  High    >60 mg/dl ('negative' risk factor for CHD)        LDL Reference Ranges  (U.S. Department of Health and Human Services ATP III Classifications)    Optimal          <100 mg/dL  Near Optimal     100-129 mg/dL  Borderline High  130-159 mg/dL  High             160-189 mg/dL  Very High        >189 mg/dL    Hemoglobin A1c [[id number]]  (Abnormal) Collected: 09/27/24 1234    Specimen: Blood from Arm, Left Updated: 09/27/24 1827     Hemoglobin A1C 6.29 %     BNP [[id number]]  (Normal) Collected: 09/27/24 1234    Specimen: Blood from Arm, Left Updated: 09/27/24 1825     proBNP 629.0 pg/mL     Narrative:      This assay is used as an aid in the diagnosis of individuals suspected of having heart  failure. It can be used as an aid in the diagnosis of acute decompensated heart failure (ADHF) in patients presenting with signs and symptoms of ADHF to the emergency department (ED). In addition, NT-proBNP of <300 pg/mL indicates ADHF is not likely.    Age Range Result Interpretation  NT-proBNP Concentration (pg/mL:      <50             Positive            >450                   Gray                 300-450                    Negative             <300    50-75           Positive            >900                  Gray                300-900                  Negative            <300      >75             Positive            >1800                  Gray                300-1800                  Negative            <300    High Sensitivity Troponin T 2Hr [[id number]]  (Abnormal) Collected: 09/27/24 1502    Specimen: Blood Updated: 09/27/24 1531     HS Troponin T 10 ng/L      Troponin T Delta -9 ng/L     Narrative:      High Sensitive Troponin T Reference Range:  <14.0 ng/L- Negative Female for AMI  <22.0 ng/L- Negative Male for AMI  >=14 - Abnormal Female indicating possible myocardial injury.  >=22 - Abnormal Male indicating possible myocardial injury.   Clinicians would have to utilize clinical acumen, EKG, Troponin, and serial changes to determine if it is an Acute Myocardial Infarction or myocardial injury due to an underlying chronic condition.         Comprehensive Metabolic Panel [[id number]]  (Abnormal) Collected: 09/27/24 1234    Specimen: Blood from Arm, Left Updated: 09/27/24 1316     Glucose 110 mg/dL      BUN 11 mg/dL      Creatinine 0.98 mg/dL      Sodium 140 mmol/L      Potassium 4.3 mmol/L      Comment: Slight hemolysis detected by analyzer. Result may be falsely elevated.        Chloride 104 mmol/L      CO2 28.5 mmol/L      Calcium 9.3 mg/dL      Total Protein 7.1 g/dL      Albumin 4.0 g/dL      ALT (SGPT) 20 U/L      AST (SGOT) 25 U/L      Alkaline Phosphatase 128 U/L      Total Bilirubin 0.5 mg/dL       Globulin 3.1 gm/dL      A/G Ratio 1.3 g/dL      BUN/Creatinine Ratio 11.2     Anion Gap 7.5 mmol/L      eGFR 90.5 mL/min/1.73     Narrative:      GFR Normal >60  Chronic Kidney Disease <60  Kidney Failure <15      High Sensitivity Troponin T [[id number]]  (Normal) Collected: 09/27/24 1234    Specimen: Blood from Arm, Left Updated: 09/27/24 1308     HS Troponin T 19 ng/L     Narrative:      High Sensitive Troponin T Reference Range:  <14.0 ng/L- Negative Female for AMI  <22.0 ng/L- Negative Male for AMI  >=14 - Abnormal Female indicating possible myocardial injury.  >=22 - Abnormal Male indicating possible myocardial injury.   Clinicians would have to utilize clinical acumen, EKG, Troponin, and serial changes to determine if it is an Acute Myocardial Infarction or myocardial injury due to an underlying chronic condition.         Painter Draw [[id number]] Collected: 09/27/24 1234    Specimen: Blood from Arm, Left Updated: 09/27/24 1245    Narrative:      The following orders were created for panel order Painter Draw.  Procedure                               Abnormality         Status                     ---------                               -----------         ------                     Green Top (Gel)[[id number]]                                  Final result               Lavender Top[[id number]]                                     Final result               Gold Top - SST[[id number]]                                   Final result               Light Blue Top[[id number]]                                   Final result                 Please view results for these tests on the individual orders.    Green Top (Gel) [[id number]] Collected: 09/27/24 1234    Specimen: Blood from Arm, Left Updated: 09/27/24 1245     Extra Tube Hold for add-ons.     Comment: Auto resulted.       Lavender Top [[id number]] Collected: 09/27/24 1234    Specimen: Blood from Arm, Left Updated: 09/27/24 1245     Extra Tube hold for add-on     Comment:  Auto resulted       Gold Top - SST [[id number]] Collected: 09/27/24 1234    Specimen: Blood from Arm, Left Updated: 09/27/24 1245     Extra Tube Hold for add-ons.     Comment: Auto resulted.       Light Blue Top [[id number]] Collected: 09/27/24 1234    Specimen: Blood from Arm, Left Updated: 09/27/24 1245     Extra Tube Hold for add-ons.     Comment: Auto resulted       CBC & Differential [[id number]]  (Normal) Collected: 09/27/24 1234    Specimen: Blood from Arm, Left Updated: 09/27/24 1242    Narrative:      The following orders were created for panel order CBC & Differential.  Procedure                               Abnormality         Status                     ---------                               -----------         ------                     CBC Auto Differential[[id number]]        Normal              Final result                 Please view results for these tests on the individual orders.    CBC Auto Differential [[id number]]  (Normal) Collected: 09/27/24 1234    Specimen: Blood from Arm, Left Updated: 09/27/24 1242     WBC 6.81 10*3/mm3      RBC 5.40 10*6/mm3      Hemoglobin 15.7 g/dL      Hematocrit 48.8 %      MCV 90.4 fL      MCH 29.1 pg      MCHC 32.2 g/dL      RDW 12.9 %      RDW-SD 42.6 fl      MPV 9.7 fL      Platelets 207 10*3/mm3      Neutrophil % 54.5 %      Lymphocyte % 34.2 %      Monocyte % 10.1 %      Eosinophil % 0.6 %      Basophil % 0.3 %      Immature Grans % 0.3 %      Neutrophils, Absolute 3.71 10*3/mm3      Lymphocytes, Absolute 2.33 10*3/mm3      Monocytes, Absolute 0.69 10*3/mm3      Eosinophils, Absolute 0.04 10*3/mm3      Basophils, Absolute 0.02 10*3/mm3      Immature Grans, Absolute 0.02 10*3/mm3      nRBC 0.0 /100 WBC             Imaging Results (Last 24 Hours)       Procedure Component Value Units Date/Time    XR Chest 1 View [[id number]] Collected: 09/27/24 1231     Updated: 09/27/24 1234    Narrative:      XR CHEST 1 VW    Date of Exam: 9/27/2024 12:27 PM EDT    Indication: Chest  Pain Protocol  Chest Pain Protocol    Comparison: CT chest 12/4/2020. PA and lateral chest 6/22/2015.    Findings:  Lungs are clear. Heart size is within normal limits. There is no pleural effusion, pneumothorax or acute osseous abnormality.      Impression:      Impression:  No acute cardiopulmonary findings.      Electronically Signed: Celia Aggarwal MD    9/27/2024 12:32 PM EDT    Workstation ID: [id number]            LAB RESULTS (LAST 7 DAYS)    CBC  Results from last 7 days   Lab Units 09/27/24  1234   WBC 10*3/mm3 6.81   RBC 10*6/mm3 5.40   HEMOGLOBIN g/dL 15.7   HEMATOCRIT % 48.8   MCV fL 90.4   PLATELETS 10*3/mm3 207       BMP  Results from last 7 days   Lab Units 09/27/24  1234   SODIUM mmol/L 140   POTASSIUM mmol/L 4.3   CHLORIDE mmol/L 104   CO2 mmol/L 28.5   BUN mg/dL 11   CREATININE mg/dL 0.98   GLUCOSE mg/dL 110*       CMP   Results from last 7 days   Lab Units 09/27/24  1234   SODIUM mmol/L 140   POTASSIUM mmol/L 4.3   CHLORIDE mmol/L 104   CO2 mmol/L 28.5   BUN mg/dL 11   CREATININE mg/dL 0.98   GLUCOSE mg/dL 110*   ALBUMIN g/dL 4.0   BILIRUBIN mg/dL 0.5   ALK PHOS U/L 128*   AST (SGOT) U/L 25   ALT (SGPT) U/L 20       BNP        TROPONIN  Results from last 7 days   Lab Units 09/27/24  1502   HSTROP T ng/L 10       CoAg        Creatinine Clearance  Estimated Creatinine Clearance: 99.3 mL/min (by C-G formula based on SCr of 0.98 mg/dL).    ABG        Radiology  XR Chest 1 View    Result Date: 9/27/2024  Impression: No acute cardiopulmonary findings. Electronically Signed: Celia Aggarwal MD  9/27/2024 12:32 PM EDT  Workstation ID: [id number]       EKG  I personally viewed and interpreted the patient's EKG/Telemetry data:  ECG 12 Lead Chest Pain   Preliminary Result   HEART RATE=93  bpm   RR Ifnszslx=785  ms   VA Interval=  ms   P Horizontal Axis=  deg   P Front Axis=  deg   QRSD Interval=87  ms   QT Ujluudpw=410  ms   ADwE=305  ms   QRS Axis=51  deg   T Wave Axis=4  deg   - ABNORMAL ECG -   Atrial  fibrillation   Date and Time of Study:2024-09-27 12:04:33      Telemetry Scan   Final Result      Telemetry Scan   Final Result      ECG 12 Lead Chest Pain    (Results Pending)         Echocardiogram:    Results for orders placed during the hospital encounter of 04/26/23    Adult Transthoracic Echo Complete W/ Cont if Necessary Per Protocol    Interpretation Summary    Estimated right ventricular systolic pressure from tricuspid regurgitation is normal (<35 mmHg).    Indications  Arrhythmia    Technically satisfactory study.  Mitral valve is structurally normal.  Tricuspid valve is structurally normal.  Aortic valve is structurally normal.  Pulmonic valve could not be well visualized.  Moderate to significant mitral and moderate tricuspid regurgitation.  Left atrium is enlarged.  Right atrium is normal in size.  Left ventricle is normal in size and contractility with ejection fraction of 60%.  Right ventricle is normal in size.  Atrial septum is intact.  Aorta is normal.  No pericardial effusion or intracardiac thrombus is seen.    Impression  Moderate to significant mitral and moderate tricuspid regurgitation.  Calculated pulmonary artery pressure is 40 mmHg.  Left atrial enlargement.  Left ventricular size and contractility is normal with ejection fraction of 60%.        Stress Test:         Cardiac Catheterization:  No results found for this or any previous visit.         Other:         ASSESSMENT & PLAN:    Principal Problem:    Chest pain      Chest pain  Atypical chest pain.  ECG shows atrial fibrillation but no evidence of ischemia.  High-sensitivity troponin is 19 and 10.  Nuclear stress test is negative for ischemia.  Okay to discharge from cardiac standpoint     Atrial fibrillation  YHB2RS0-YAZg score is 3  Continue Eliquis 5 mg p.o. twice daily.  Continue metoprolol and Cardizem for rate control     Hypertension  Resume Cardizem and metoprolol.  Can add ACE inhibitor or ARB if needed for better blood  pressure control     Hyperlipidemia  Continue high intensity statin  LDL 48, HDL 43, triglycerides 47 and total cholesterol 103  A1c 6.2     Valvular heart disease  HFpEF secondary to hypertension and valvular heart disease.  Previous echocardiogram showed severely enlarged bilateral atria and secondary mitral and tricuspid valve regurgitation.  Repeat echocardiogram to follow-up on valvular heart disease and LV dimension  proBNP is normal 629    Horacio Huddleston MD  09/28/24  06:05 EDT

## 2024-09-28 NOTE — CASE MANAGEMENT/SOCIAL WORK
Case Management Discharge Note      Final Note: Routine home.       Selected Continued Care - Discharged on 9/28/2024 Admission date: 9/27/2024 - Discharge disposition: Home or Self Care       Transportation Services  Private: Car    Final Discharge Disposition Code: 01 - home or self-care

## 2024-09-28 NOTE — DISCHARGE SUMMARY
[facility]    Eugenia John APRN    CHIEF COMPLAINT:     Chest Jacqueline n    HISTORY OF PRESENT ILLNESS:    [facility]  ED 09/28/2024  54-year-old male with history of chronic atrial fibrillation and previous stroke who states he has had some substernal sharp chest pain since around 9:30 AM this morning but came on after he yelled at a To get out of the room. He reports no associated shortness of breath or cough or fever. He describes the pain as somewhat sharp in nature is nonradiating and intermittent since the time of onset. He reports no relieving or exacerbating factors.     Observation 09/28/2024  Patient agrees with HPI noted above.  He started having chest pain yesterday and described as sharp.  He has been asymptomatic since admitted to the observation unit.  His wife at bedside confirms that patient and herself have been going through significant amount of stress lately.  He follows up with Dr. Birch for A-fib and has a history of stroke.  He denies any history of coronary artery disease.    Past Medical History:   Diagnosis Date    AF (paroxysmal atrial fibrillation)     Atrial flutter     Injury of back     spinal fusion    Stroke     2016     Past Surgical History:   Procedure Laterality Date    CARDIAC SURGERY  2009    Ablation    JOINT REPLACEMENT  2011    Hip replacement    REVISION OF TOTAL HIP FEMORAL Left     SPINE SURGERY  2007    Fusion    VASECTOMY  2005     History reviewed. No pertinent family history.  Social History     Tobacco Use    Smoking status: Never     Passive exposure: Past    Smokeless tobacco: Former     Types: Chew   Vaping Use    Vaping status: Never Used   Substance Use Topics    Alcohol use: Not Currently    Drug use: Never     Medications Prior to Admission   Medication Sig Dispense Refill Last Dose    apixaban (Eliquis) 5 MG tablet tablet TAKE 1 TABLET BY MOUTH TWICE DAILY 180 tablet 3 9/27/2024    atorvastatin (LIPITOR) 40 MG tablet TAKE 1 TABLET BY MOUTH  EVERY NIGHT AT BEDTIME 90 tablet 3 9/27/2024    dilTIAZem (CARDIZEM) 60 MG tablet TAKE 1 TABLET BY MOUTH TWICE DAILY 180 tablet 3 9/27/2024    metoprolol tartrate (LOPRESSOR) 50 MG tablet TAKE 1 TABLET BY MOUTH TWICE DAILY 180 tablet 3 9/27/2024     Allergies:  Patient has no known allergies.    Immunization History   Administered Date(s) Administered    COVID-19 (PFIZER) Purple Cap Monovalent 03/17/2021, 04/07/2021    Flu Vaccine Intradermal Quad 18-64YR 10/16/2012    Fluzone (or Fluarix & Flulaval for VFC) >6mos 10/26/2022    TD Preservative Free (Tenivac) 01/01/2004           REVIEW OF SYSTEMS:    Review of Systems   Constitutional: Negative for diaphoresis.   Cardiovascular:  Positive for chest pain and irregular heartbeat.   Respiratory:  Negative for shortness of breath.    Gastrointestinal:  Negative for nausea and vomiting.   Neurological:         Hx of stroke, slurred speech   All other systems reviewed and are negative.        Vital Signs  Temp:  [97.8 °F (36.6 °C)-98.4 °F (36.9 °C)] 97.8 °F (36.6 °C)  Heart Rate:  [67-91] 90  Resp:  [12-22] 17  BP: (101-147)/(72-95) 136/92          Physical Exam:  Physical Exam  Vitals and nursing note reviewed.   Constitutional:       Appearance: Normal appearance. He is obese.   HENT:      Head: Normocephalic and atraumatic.      Right Ear: External ear normal.      Left Ear: External ear normal.      Nose: Nose normal.      Mouth/Throat:      Mouth: Mucous membranes are moist.   Eyes:      Extraocular Movements: Extraocular movements intact.   Cardiovascular:      Rate and Rhythm: Normal rate. Rhythm irregular.      Pulses: Normal pulses.      Heart sounds: Normal heart sounds.      Comments: Afib per baseline  Pulmonary:      Effort: Pulmonary effort is normal.      Breath sounds: Normal breath sounds.   Abdominal:      General: Abdomen is flat. Bowel sounds are normal.      Palpations: Abdomen is soft.   Musculoskeletal:         General: Normal range of motion.       Cervical back: Normal range of motion.   Skin:     General: Skin is warm.   Neurological:      Mental Status: He is alert and oriented to person, place, and time.      Comments: Hx of stroke, slurred speech   Psychiatric:         Behavior: Behavior normal.         Thought Content: Thought content normal.         Judgment: Judgment normal.           Emotional Behavior:    Normal    Debilities:   None  Results Review:    I reviewed the patient's new clinical results.  Lab Results (most recent)       Procedure Component Value Units Date/Time    High Sensitivity Troponin T [[id number]] Updated: 09/28/24 0744    Specimen: Blood from Hand, Left     Basic Metabolic Panel [[id number]] Updated: 09/28/24 0744    Specimen: Blood from Hand, Left     CBC & Differential [[id number]]  (Abnormal) Collected: 09/28/24 0541    Specimen: Blood from Hand, Left Updated: 09/28/24 0621    Narrative:      The following orders were created for panel order CBC & Differential.  Procedure                               Abnormality         Status                     ---------                               -----------         ------                     CBC Auto Differential[[id number]]        Abnormal            Final result                 Please view results for these tests on the individual orders.    CBC Auto Differential [[id number]]  (Abnormal) Collected: 09/28/24 0541    Specimen: Blood from Hand, Left Updated: 09/28/24 0621     WBC 7.35 10*3/mm3      RBC 5.60 10*6/mm3      Hemoglobin 15.8 g/dL      Hematocrit 50.3 %      MCV 89.8 fL      MCH 28.2 pg      MCHC 31.4 g/dL      RDW 12.9 %      RDW-SD 42.6 fl      MPV 10.5 fL      Platelets 175 10*3/mm3      Neutrophil % 59.7 %      Lymphocyte % 28.7 %      Monocyte % 9.8 %      Eosinophil % 1.1 %      Basophil % 0.4 %      Immature Grans % 0.3 %      Neutrophils, Absolute 4.39 10*3/mm3      Lymphocytes, Absolute 2.11 10*3/mm3      Monocytes, Absolute 0.72 10*3/mm3      Eosinophils, Absolute 0.08  10*3/mm3      Basophils, Absolute 0.03 10*3/mm3      Immature Grans, Absolute 0.02 10*3/mm3      nRBC 0.0 /100 WBC     Lipid Panel [[id number]] Collected: 09/27/24 1234    Specimen: Blood from Arm, Left Updated: 09/27/24 1919     Total Cholesterol 106 mg/dL      Triglycerides 61 mg/dL      HDL Cholesterol 44 mg/dL      LDL Cholesterol  48 mg/dL      VLDL Cholesterol 14 mg/dL      LDL/HDL Ratio 1.13    Narrative:      Cholesterol Reference Ranges  (U.S. Department of Health and Human Services ATP III Classifications)    Desirable          <200 mg/dL  Borderline High    200-239 mg/dL  High Risk          >240 mg/dL      Triglyceride Reference Ranges  (U.S. Department of Health and Human Services ATP III Classifications)    Normal           <150 mg/dL  Borderline High  150-199 mg/dL  High             200-499 mg/dL  Very High        >500 mg/dL    HDL Reference Ranges  (U.S. Department of Health and Human Services ATP III Classifications)    Low     <40 mg/dl (major risk factor for CHD)  High    >60 mg/dl ('negative' risk factor for CHD)        LDL Reference Ranges  (U.S. Department of Health and Human Services ATP III Classifications)    Optimal          <100 mg/dL  Near Optimal     100-129 mg/dL  Borderline High  130-159 mg/dL  High             160-189 mg/dL  Very High        >189 mg/dL    Hemoglobin A1c [[id number]]  (Abnormal) Collected: 09/27/24 1234    Specimen: Blood from Arm, Left Updated: 09/27/24 1827     Hemoglobin A1C 6.29 %     BNP [[id number]]  (Normal) Collected: 09/27/24 1234    Specimen: Blood from Arm, Left Updated: 09/27/24 1825     proBNP 629.0 pg/mL     Narrative:      This assay is used as an aid in the diagnosis of individuals suspected of having heart failure. It can be used as an aid in the diagnosis of acute decompensated heart failure (ADHF) in patients presenting with signs and symptoms of ADHF to the emergency department (ED). In addition, NT-proBNP of <300 pg/mL indicates ADHF is not  likely.    Age Range Result Interpretation  NT-proBNP Concentration (pg/mL:      <50             Positive            >450                   Gray                 300-450                    Negative             <300    50-75           Positive            >900                  Gray                300-900                  Negative            <300      >75             Positive            >1800                  Gray                300-1800                  Negative            <300    High Sensitivity Troponin T 2Hr [[id number]]  (Abnormal) Collected: 09/27/24 1502    Specimen: Blood Updated: 09/27/24 1531     HS Troponin T 10 ng/L      Troponin T Delta -9 ng/L     Narrative:      High Sensitive Troponin T Reference Range:  <14.0 ng/L- Negative Female for AMI  <22.0 ng/L- Negative Male for AMI  >=14 - Abnormal Female indicating possible myocardial injury.  >=22 - Abnormal Male indicating possible myocardial injury.   Clinicians would have to utilize clinical acumen, EKG, Troponin, and serial changes to determine if it is an Acute Myocardial Infarction or myocardial injury due to an underlying chronic condition.         Comprehensive Metabolic Panel [[id number]]  (Abnormal) Collected: 09/27/24 1234    Specimen: Blood from Arm, Left Updated: 09/27/24 1316     Glucose 110 mg/dL      BUN 11 mg/dL      Creatinine 0.98 mg/dL      Sodium 140 mmol/L      Potassium 4.3 mmol/L      Comment: Slight hemolysis detected by analyzer. Result may be falsely elevated.        Chloride 104 mmol/L      CO2 28.5 mmol/L      Calcium 9.3 mg/dL      Total Protein 7.1 g/dL      Albumin 4.0 g/dL      ALT (SGPT) 20 U/L      AST (SGOT) 25 U/L      Alkaline Phosphatase 128 U/L      Total Bilirubin 0.5 mg/dL      Globulin 3.1 gm/dL      A/G Ratio 1.3 g/dL      BUN/Creatinine Ratio 11.2     Anion Gap 7.5 mmol/L      eGFR 90.5 mL/min/1.73     Narrative:      GFR Normal >60  Chronic Kidney Disease <60  Kidney Failure <15      High Sensitivity Troponin T  [[id number]]  (Normal) Collected: 09/27/24 1234    Specimen: Blood from Arm, Left Updated: 09/27/24 1308     HS Troponin T 19 ng/L     Narrative:      High Sensitive Troponin T Reference Range:  <14.0 ng/L- Negative Female for AMI  <22.0 ng/L- Negative Male for AMI  >=14 - Abnormal Female indicating possible myocardial injury.  >=22 - Abnormal Male indicating possible myocardial injury.   Clinicians would have to utilize clinical acumen, EKG, Troponin, and serial changes to determine if it is an Acute Myocardial Infarction or myocardial injury due to an underlying chronic condition.         [place] Draw [[id number]] Collected: 09/27/24 1234    Specimen: Blood from Arm, Left Updated: 09/27/24 1245    Narrative:      The following orders were created for panel order [place] Draw.  Procedure                               Abnormality         Status                     ---------                               -----------         ------                     Green Top (Gel)[[id number]]                                  Final result               Lavender Top[[id number]]                                     Final result               Gold Top - SST[[id number]]                                   Final result               Light Blue Top[[id number]]                                   Final result                 Please view results for these tests on the individual orders.    Green Top (Gel) [[id number]] Collected: 09/27/24 1234    Specimen: Blood from Arm, Left Updated: 09/27/24 1245     Extra Tube Hold for add-ons.     Comment: Auto resulted.       Lavender Top [[id number]] Collected: 09/27/24 1234    Specimen: Blood from Arm, Left Updated: 09/27/24 1245     Extra Tube hold for add-on     Comment: Auto resulted       Gold Top - SST [[id number]] Collected: 09/27/24 1234    Specimen: Blood from Arm, Left Updated: 09/27/24 1245     Extra Tube Hold for add-ons.     Comment: Auto resulted.       Light Blue Top [[id number]] Collected: 09/27/24  1234    Specimen: Blood from Arm, Left Updated: 09/27/24 1245     Extra Tube Hold for add-ons.     Comment: Auto resulted       CBC & Differential [[id number]]  (Normal) Collected: 09/27/24 1234    Specimen: Blood from Arm, Left Updated: 09/27/24 1242    Narrative:      The following orders were created for panel order CBC & Differential.  Procedure                               Abnormality         Status                     ---------                               -----------         ------                     CBC Auto Differential[[id number]]        Normal              Final result                 Please view results for these tests on the individual orders.    CBC Auto Differential [[id number]]  (Normal) Collected: 09/27/24 1234    Specimen: Blood from Arm, Left Updated: 09/27/24 1242     WBC 6.81 10*3/mm3      RBC 5.40 10*6/mm3      Hemoglobin 15.7 g/dL      Hematocrit 48.8 %      MCV 90.4 fL      MCH 29.1 pg      MCHC 32.2 g/dL      RDW 12.9 %      RDW-SD 42.6 fl      MPV 9.7 fL      Platelets 207 10*3/mm3      Neutrophil % 54.5 %      Lymphocyte % 34.2 %      Monocyte % 10.1 %      Eosinophil % 0.6 %      Basophil % 0.3 %      Immature Grans % 0.3 %      Neutrophils, Absolute 3.71 10*3/mm3      Lymphocytes, Absolute 2.33 10*3/mm3      Monocytes, Absolute 0.69 10*3/mm3      Eosinophils, Absolute 0.04 10*3/mm3      Basophils, Absolute 0.02 10*3/mm3      Immature Grans, Absolute 0.02 10*3/mm3      nRBC 0.0 /100 WBC             Imaging Results (Most Recent)       Procedure Component Value Units Date/Time    XR Chest 1 View [[id number]] Collected: 09/27/24 1231     Updated: 09/27/24 1234    Narrative:      XR CHEST 1 VW    Date of Exam: 9/27/2024 12:27 PM EDT    Indication: Chest Pain Protocol  Chest Pain Protocol    Comparison: CT chest 12/4/2020. PA and lateral chest 6/22/2015.    Findings:  Lungs are clear. Heart size is within normal limits. There is no pleural effusion, pneumothorax or acute osseous abnormality.       Impression:      Impression:  No acute cardiopulmonary findings.      Electronically Signed: Celia Aggarwal MD    9/27/2024 12:32 PM EDT    Workstation ID: [id number]          reviewed    ECG/EMG Results (most recent)       Procedure Component Value Units Date/Time    Telemetry Scan [[id number]] Resulted: 09/27/24     Updated: 09/27/24 2159    Telemetry Scan [[id number]] Resulted: 09/27/24     Updated: 09/27/24 2346    ECG 12 Lead Chest Pain [[id number]] Collected: 09/27/24 1204     Updated: 09/28/24 0734     QT Interval 369 ms      QTC Interval 460 ms     Narrative:      HEART RATE=93  bpm  RR Lmchfpgh=031  ms  GA Interval=  ms  P Horizontal Axis=  deg  P Front Axis=  deg  QRSD Interval=87  ms  QT Rxcrrwde=226  ms  GIaQ=609  ms  QRS Axis=51  deg  T Wave Axis=4  deg  - ABNORMAL ECG -  Atrial fibrillation  Electronically Signed By: Ronaldo Brownlee ([facility]) 2024-09-28 07:34:18  Date and Time of Study:2024-09-27 12:04:33    Telemetry Scan [[id number]] Resulted: 09/27/24     Updated: 09/28/24 0922    Adult Transthoracic Echo Complete W/ Cont if Necessary Per Protocol [[id number]] Resulted: 09/28/24 1026     Updated: 09/28/24 1051          reviewed        Results for orders placed during the hospital encounter of 04/26/23    Adult Transthoracic Echo Complete W/ Cont if Necessary Per Protocol    Interpretation Summary    Estimated right ventricular systolic pressure from tricuspid regurgitation is normal (<35 mmHg).    Indications  Arrhythmia    Technically satisfactory study.  Mitral valve is structurally normal.  Tricuspid valve is structurally normal.  Aortic valve is structurally normal.  Pulmonic valve could not be well visualized.  Moderate to significant mitral and moderate tricuspid regurgitation.  Left atrium is enlarged.  Right atrium is normal in size.  Left ventricle is normal in size and contractility with ejection fraction of 60%.  Right ventricle is normal in size.  Atrial septum is intact.  Aorta is normal.  No  pericardial effusion or intracardiac thrombus is seen.    Impression  Moderate to significant mitral and moderate tricuspid regurgitation.  Calculated pulmonary artery pressure is 40 mmHg.  Left atrial enlargement.  Left ventricular size and contractility is normal with ejection fraction of 60%.      Microbiology Results (last 10 days)       ** No results found for the last 240 hours. **            Assessment & Plan     Chest pain        Chest pain  Lab Results   Component Value Date    TROPONINT 10 09/27/2024    TROPONINT 19 09/27/2024   -Troponin proBNP are unremarkable  -CMP and CBC unremarkable  -Chest x-ray showed no acute cardiopulmonary findings  -EKG showed atrial fibrillation with heart rate 93  -In the ED patient given aspirin and cardiology consulted  -Cardiology ordered stress test and 2D echo  -Stress test showed normal myocardial perfusion and no signs of ischemia  -2D echo pending results   -Telemetry  -Cardiology recommended discharge with outpatient f/u.     Atrial fibrillation  -Continue Eliquis, metoprolol and Cardizem  -Heart rate 80s to 90s    Hyperlipidemia  -Continue statin    Obesity  -BMI 31.28  -Lifestyle modifications    I discussed the patients findings and my recommendations with patient and nursing staff.     Discharge Diagnosis:      Chest pain      Hospital Course  Patient is a 54 y.o. male presented with chest pain as noted in HPI above.  Troponin, proBNP, CMP, CBC, chest x-ray unremarkable.  EKG showed atrial fibrillation with heart rate of 93.  Patient is on Eliquis, metoprolol and Cardizem for chronic A-fib.  Patient received aspirin in the ED and cardiology was consulted.  He was monitored overnight in the observation unit on telemetry.  Cardiology ordered a stress test which showed no signs of ischemia as well as 2D echo which is currently pending results. At this time, patient felt to be in good condition for discharge with close follow up with PCP and cardiology. Instructed to  take all medications as prescribed and to return to ED if any concerning signs/symptoms. All test/lab results were discussed with patient. All questions were answered and patient verbalizes understanding.     Past Medical History:     Past Medical History:   Diagnosis Date    AF (paroxysmal atrial fibrillation)     Atrial flutter     Injury of back     spinal fusion    Stroke     2016       Past Surgical History:     Past Surgical History:   Procedure Laterality Date    CARDIAC SURGERY  2009    Ablation    JOINT REPLACEMENT  2011    Hip replacement    REVISION OF TOTAL HIP FEMORAL Left     SPINE SURGERY  2007    Fusion    VASECTOMY  2005       Social History:   Social History     Socioeconomic History    Marital status:    Tobacco Use    Smoking status: Never     Passive exposure: Past    Smokeless tobacco: Former     Types: Chew   Vaping Use    Vaping status: Never Used   Substance and Sexual Activity    Alcohol use: Not Currently    Drug use: Never    Sexual activity: Yes     Partners: Female     Birth control/protection: Vasectomy       Procedures Performed         Consults:   Consults       Date and Time Order Name Status Description    9/27/2024  5:49 PM Inpatient Cardiology Consult Completed             Condition on Discharge:     Stable    Discharge Disposition      Discharge Medications     Discharge Medications        Continue These Medications        Instructions Start Date   atorvastatin 40 MG tablet  Commonly known as: LIPITOR   40 mg, Oral, Every Night at Bedtime      dilTIAZem 60 MG tablet  Commonly known as: CARDIZEM   60 mg, Oral, 2 Times Daily      Eliquis 5 MG tablet tablet  Generic drug: apixaban   5 mg, Oral, 2 Times Daily      metoprolol tartrate 50 MG tablet  Commonly known as: LOPRESSOR   50 mg, Oral, 2 Times Daily               Discharge Diet:     Activity at Discharge:     Follow-up Appointments  Future Appointments   Date Time Provider Department Center   10/30/2024  2:30 PM EDUARDO GARVIN  [facility]   10/30/2024  2:50 PM Giancarlo Birch MD [facility]   12/27/2024 10:30 AM Eugenia John APRN [facility]         Test Results Pending at Discharge       Risk for Readmission (LACE) Score: 2 (9/28/2024  6:00 AM)      Greater than 30 minutes spent in discharge activities for this patient    Signature:Electronically signed by ITALO Chandler, 09/28/24, 11:01 AM EDT.

## 2024-09-30 ENCOUNTER — TRANSITIONAL CARE MANAGEMENT TELEPHONE ENCOUNTER (OUTPATIENT)
Dept: CALL CENTER | Facility: HOSPITAL | Age: 54
End: 2024-09-30
Payer: MEDICARE

## 2024-09-30 NOTE — OUTREACH NOTE
Call Center TCM Note      Flowsheet Row Responses   [facility] patient discharged from? [facility]   Does the patient have one of the following disease processes/diagnoses(primary or secondary)? Other   TCM attempt successful? Yes   Call start time 0943   Call end time 0946   Discharge diagnosis Chest pain   Person spoke with today (if not patient) and relationship spouse   Meds reviewed with patient/caregiver? Yes   Is the patient having any side effects they believe may be caused by any medication additions or changes? No   Does the patient have all medications ordered at discharge? N/A   Is the patient taking all medications as directed (includes completed medication regime)? Yes   Comments Cardiology 10/30/24   Does the patient have an appointment with their PCP within 7-14 days of discharge? Yes  [10/10/2024 10:00 AM]   Psychosocial issues? No   Did the patient receive a copy of their discharge instructions? Yes   Nursing interventions Reviewed instructions with patient   What is the patient's perception of their health status since discharge? Improving   Is the patient/caregiver able to teach back signs and symptoms related to disease process for when to call PCP? Yes   Is the patient/caregiver able to teach back signs and symptoms related to disease process for when to call 911? Yes   Is the patient/caregiver able to teach back the hierarchy of who to call/visit for symptoms/problems? PCP, Specialist, Home health nurse, Urgent Care, ED, 911 Yes   If the patient is a current smoker, are they able to teach back resources for cessation? Not a smoker   TCM call completed? Yes   Wrap up additional comments Spouse denies pt has any chest pain. No medication changes. Spouse verified PCP/cardiology fu appts.   Call end time 0946   Would this patient benefit from a Referral to Amb Social Work? No   Is the patient interested in additional calls from an ambulatory ? No            Jazmín Horne  RN    9/30/2024, 09:47 EDT

## 2024-10-22 NOTE — PROGRESS NOTES
Encounter Date:10/30/2024  Last seen 4/29/2024      Patient ID: Claudy Pabon is a 54 y.o. male.    Chief Complaint:     History of atrial flutter  Anticoagulation management  Persistent atrial fibrillation  Hypertension  Mitral regurgitation     History of Present Illness  Since I have last seen, the patient has been without any chest discomfort ,shortness of breath, palpitations, dizziness or syncope.  Denies having any headache ,abdominal pain ,nausea, vomiting , diarrhea constipation, loss of weight or loss of appetite.  Denies having any excessive bruising ,hematuria or blood in the stool.    Review of all systems negative except as indicated.    Reviewed ROS.    Assessment and Plan         [[[[[[[[[[[  History  ============  -Chronic atrial fibrillation      - history of atrial flutter.  Patient had flutter ablation 2006 at [facility] by Dr. Gonzalez.     - Moderate tricuspid and moderate to significant mitral regurgitation.    Echocardiogram 9/28/2024   Left ventricular systolic function is normal. Calculated left ventricular 3D EF = 62% Left ventricular ejection fraction appears to be 61 - 65%.    Left ventricular diastolic dysfunction is noted.    The left atrial cavity is severely dilated.    The right atrial cavity is dilated.    Moderate mitral valve regurgitation is present.    Estimated right ventricular systolic pressure from tricuspid regurgitation is mildly elevated (35-45 mmHg).     Echocardiogram 4/26/2023 revealed  Moderate to significant mitral and moderate tricuspid regurgitation.  Calculated pulmonary artery pressure is 40 mmHg.  Left atrial enlargement.  Left ventricular size and contractility is normal with ejection fraction of 60%.     -anticoagulation - on Eliquis.     - history of stroke from which he partially recovered September 2016     -hypertension -well-controlled.     -History of movement disorder on Keppra     -history of back surgery and left hip  surgery  ==================    Plan  ===============  Moderate to significant mitral and moderate tricuspid regurgitation.  Patient and patient's wife were explained about symptoms to watch for such as shortness of breath etc.     Echocardiogram 4/26/2023 revealed  Moderate to significant mitral and moderate tricuspid regurgitation.  Calculated pulmonary artery pressure is 40 mmHg.  Left atrial enlargement.  Left ventricular size and contractility is normal with ejection fraction of 60%.    Results of the testing were discussed and educated patient.     Persistent atrial fibrillation  EKG 3/29/2023-atrial fibrillation  EKG 4/29/2024-atrial fibrillation  EKG was not done today 9/30/2024.    Anticoagulation status reviewed.  Patient is on Eliquis.  Observe for toxic effects.     Hypertension-well controlled  128/80  Continue diltiazem metoprolol     Dyslipidemia-continue atorvastatin     Movement disorder-on Keppra     Patient is not having any angina pectoris or congestive heart failure.     Medications were reviewed and updated.     Followup in the office  in 6 months.    Further plan will depend on patient's progress.     Reviewed and updated 10/30/2024.  [[[[[[[[[[[[[[[[[[[[               Diagnosis Plan   1. Paroxysmal atrial fibrillation        2. Essential (primary) hypertension        3. Nonrheumatic mitral valve regurgitation        4. Paroxysmal atrial flutter        LAB RESULTS (LAST 7 DAYS)    CBC        BMP        CMP         BNP        TROPONIN        CoAg        Creatinine Clearance  Estimated Creatinine Clearance: 99.3 mL/min (by C-G formula based on SCr of 0.98 mg/dL).    ABG        Radiology  No radiology results for the last day                The following portions of the patient's history were reviewed and updated as appropriate: allergies, current medications, past family history, past medical history, past social history, past surgical history, and problem list.    Review of Systems    Constitutional: Negative for malaise/fatigue.   Cardiovascular:  Negative for chest pain, dyspnea on exertion, leg swelling and palpitations.   Respiratory:  Negative for cough and shortness of breath.    Gastrointestinal:  Negative for abdominal pain, nausea and vomiting.   Neurological:  Negative for dizziness, focal weakness, headaches, light-headedness and numbness.   All other systems reviewed and are negative.      Current Outpatient Medications:     apixaban (Eliquis) 5 MG tablet tablet, TAKE 1 TABLET BY MOUTH TWICE DAILY, Disp: 180 tablet, Rfl: 3    atorvastatin (LIPITOR) 40 MG tablet, TAKE 1 TABLET BY MOUTH EVERY NIGHT AT BEDTIME, Disp: 90 tablet, Rfl: 3    dilTIAZem (CARDIZEM) 60 MG tablet, TAKE 1 TABLET BY MOUTH TWICE DAILY, Disp: 180 tablet, Rfl: 3    metoprolol tartrate (LOPRESSOR) 50 MG tablet, TAKE 1 TABLET BY MOUTH TWICE DAILY, Disp: 180 tablet, Rfl: 3    No Known Allergies    No family history on file.    Past Surgical History:   Procedure Laterality Date    CARDIAC SURGERY  2009    Ablation    JOINT REPLACEMENT  2011    Hip replacement    REVISION OF TOTAL HIP FEMORAL Left     SPINE SURGERY  2007    Fusion    VASECTOMY  2005       Past Medical History:   Diagnosis Date    AF (paroxysmal atrial fibrillation)     Atrial flutter     Injury of back     spinal fusion    Stroke     2016       No family history on file.    Social History     Socioeconomic History    Marital status:    Tobacco Use    Smoking status: Never     Passive exposure: Past    Smokeless tobacco: Former     Types: Chew   Vaping Use    Vaping status: Never Used   Substance and Sexual Activity    Alcohol use: Not Currently    Drug use: Never    Sexual activity: Yes     Partners: Female     Birth control/protection: Vasectomy         Procedures      Objective:       Physical Exam    There were no vitals taken for this visit.  The patient is alert, oriented and in no distress.    Vital signs as noted above.    Head and neck  revealed no carotid bruits or jugular venous distension.  No thyromegaly or lymphadenopathy is present.    Lungs clear.  No wheezing.  Breath sounds are normal bilaterally.    Heart normal first and second heart sounds.  Grade 2 or 6 systolic murmur..  No pericardial rub is present.  No gallop is present.    Abdomen soft and nontender.  No organomegaly is present.    Extremities revealed good peripheral pulses without any pedal edema.    Skin warm and dry.    Musculoskeletal system is grossly normal.    CNS grossly normal.    Reviewed and updated.

## 2024-10-30 ENCOUNTER — OFFICE VISIT (OUTPATIENT)
Dept: CARDIOLOGY | Facility: CLINIC | Age: 54
End: 2024-10-30
Payer: MEDICARE

## 2024-10-30 VITALS
WEIGHT: 206 LBS | OXYGEN SATURATION: 97 % | HEIGHT: 70 IN | SYSTOLIC BLOOD PRESSURE: 128 MMHG | DIASTOLIC BLOOD PRESSURE: 80 MMHG | HEART RATE: 82 BPM | BODY MASS INDEX: 29.49 KG/M2

## 2024-10-30 DIAGNOSIS — I48.0 PAROXYSMAL ATRIAL FIBRILLATION: Primary | ICD-10-CM

## 2024-10-30 DIAGNOSIS — I10 ESSENTIAL (PRIMARY) HYPERTENSION: ICD-10-CM

## 2024-10-30 DIAGNOSIS — I48.92 PAROXYSMAL ATRIAL FLUTTER: ICD-10-CM

## 2024-10-30 DIAGNOSIS — I34.0 NONRHEUMATIC MITRAL VALVE REGURGITATION: ICD-10-CM

## 2024-10-30 PROCEDURE — 3079F DIAST BP 80-89 MM HG: CPT | Performed by: INTERNAL MEDICINE

## 2024-10-30 PROCEDURE — 1159F MED LIST DOCD IN RCRD: CPT | Performed by: INTERNAL MEDICINE

## 2024-10-30 PROCEDURE — 99214 OFFICE O/P EST MOD 30 MIN: CPT | Performed by: INTERNAL MEDICINE

## 2024-10-30 PROCEDURE — 3074F SYST BP LT 130 MM HG: CPT | Performed by: INTERNAL MEDICINE

## 2024-10-30 PROCEDURE — 1160F RVW MEDS BY RX/DR IN RCRD: CPT | Performed by: INTERNAL MEDICINE

## 2025-03-04 RX ORDER — ATORVASTATIN CALCIUM 40 MG/1
40 TABLET, FILM COATED ORAL
Qty: 90 TABLET | Refills: 3 | Status: SHIPPED | OUTPATIENT
Start: 2025-03-04

## 2025-03-04 RX ORDER — METOPROLOL TARTRATE 50 MG
50 TABLET ORAL 2 TIMES DAILY
Qty: 180 TABLET | Refills: 3 | Status: SHIPPED | OUTPATIENT
Start: 2025-03-04

## 2025-03-04 NOTE — TELEPHONE ENCOUNTER
Rx Refill Note  Requested Prescriptions     Pending Prescriptions Disp Refills    atorvastatin (LIPITOR) 40 MG tablet [Pharmacy Med Name: ATORVASTATIN 40MG TABLETS] 90 tablet 3     Sig: TAKE 1 TABLET BY MOUTH EVERY NIGHT AT BEDTIME    metoprolol tartrate (LOPRESSOR) 50 MG tablet [Pharmacy Med Name: METOPROLOL TARTRATE 50MG TABLETS] 180 tablet 3     Sig: TAKE 1 TABLET BY MOUTH TWICE DAILY      Last office visit with prescribing clinician: 10/30/2024   Last telemedicine visit with prescribing clinician: Visit date not found   Next office visit with prescribing clinician: 5/2/2025                         Would you like a call back once the refill request has been completed: [] Yes [] No    If the office needs to give you a call back, can they leave a voicemail: [] Yes [] No    Emmy Saul MA  03/04/25, 07:29 EST

## 2025-04-23 RX ORDER — APIXABAN 5 MG/1
5 TABLET, FILM COATED ORAL 2 TIMES DAILY
Qty: 180 TABLET | Refills: 1 | Status: SHIPPED | OUTPATIENT
Start: 2025-04-23

## 2025-04-23 NOTE — TELEPHONE ENCOUNTER
Rx Refill Note  Requested Prescriptions     Signed Prescriptions Disp Refills    apixaban (Eliquis) 5 MG tablet tablet 180 tablet 1     Sig: TAKE 1 TABLET BY MOUTH TWICE DAILY     Authorizing Provider: ALFONSO IYER     Ordering User: ANABEL GARZA      Last office visit with prescribing clinician: 10/30/2024   Last telemedicine visit with prescribing clinician: Visit date not found   Next office visit with prescribing clinician: 6/12/2025                         Would you like a call back once the refill request has been completed: [] Yes [] No    If the office needs to give you a call back, can they leave a voicemail: [] Yes [] No    Anabel Garza MA  04/23/25, 08:19 EDT

## 2025-05-12 RX ORDER — DILTIAZEM HCL 60 MG
60 TABLET ORAL 2 TIMES DAILY
Qty: 180 TABLET | Refills: 3 | Status: SHIPPED | OUTPATIENT
Start: 2025-05-12

## 2025-05-12 NOTE — TELEPHONE ENCOUNTER
Rx Refill Note  Requested Prescriptions     Pending Prescriptions Disp Refills    dilTIAZem (CARDIZEM) 60 MG tablet [Pharmacy Med Name: DILTIAZEM 60MG TABLETS] 180 tablet 3     Sig: TAKE 1 TABLET BY MOUTH TWICE DAILY      Last office visit with prescribing clinician: 10/30/2024   Last telemedicine visit with prescribing clinician: Visit date not found   Next office visit with prescribing clinician: 6/12/2025                         Would you like a call back once the refill request has been completed: [] Yes [] No    If the office needs to give you a call back, can they leave a voicemail: [] Yes [] No    Emmy Saul MA  05/12/25, 08:34 EDT